# Patient Record
Sex: FEMALE | Race: WHITE | NOT HISPANIC OR LATINO | URBAN - METROPOLITAN AREA
[De-identification: names, ages, dates, MRNs, and addresses within clinical notes are randomized per-mention and may not be internally consistent; named-entity substitution may affect disease eponyms.]

---

## 2018-05-12 ENCOUNTER — INPATIENT (INPATIENT)
Facility: HOSPITAL | Age: 83
LOS: 4 days | Discharge: HOME | DRG: 871 | End: 2018-05-17
Attending: INTERNAL MEDICINE | Admitting: INTERNAL MEDICINE
Payer: MEDICARE

## 2018-05-12 VITALS
RESPIRATION RATE: 16 BRPM | HEIGHT: 62 IN | HEART RATE: 92 BPM | WEIGHT: 130.07 LBS | TEMPERATURE: 103 F | SYSTOLIC BLOOD PRESSURE: 151 MMHG | OXYGEN SATURATION: 94 % | DIASTOLIC BLOOD PRESSURE: 77 MMHG

## 2018-05-12 DIAGNOSIS — J18.9 PNEUMONIA, UNSPECIFIED ORGANISM: ICD-10-CM

## 2018-05-12 PROBLEM — Z00.00 ENCOUNTER FOR PREVENTIVE HEALTH EXAMINATION: Status: ACTIVE | Noted: 2018-05-12

## 2018-05-12 LAB
ACETONE SERPL-MCNC: ABNORMAL
ALBUMIN SERPL ELPH-MCNC: 3.3 G/DL — LOW (ref 3.5–5)
ALP SERPL-CCNC: 91 U/L — SIGNIFICANT CHANGE UP (ref 40–120)
ALT FLD-CCNC: 10 U/L DA — SIGNIFICANT CHANGE UP (ref 10–60)
ANION GAP SERPL CALC-SCNC: 6 MMOL/L — SIGNIFICANT CHANGE UP (ref 5–17)
APPEARANCE UR: CLEAR — SIGNIFICANT CHANGE UP
APTT BLD: 27.4 SEC — LOW (ref 27.5–37.4)
AST SERPL-CCNC: 22 U/L — SIGNIFICANT CHANGE UP (ref 10–40)
BACTERIA # UR AUTO: ABNORMAL /HPF
BASOPHILS # BLD AUTO: 0.1 K/UL — SIGNIFICANT CHANGE UP (ref 0–0.2)
BASOPHILS NFR BLD AUTO: 0.4 % — SIGNIFICANT CHANGE UP (ref 0–2)
BILIRUB SERPL-MCNC: 0.6 MG/DL — SIGNIFICANT CHANGE UP (ref 0.2–1.2)
BILIRUB UR-MCNC: NEGATIVE — SIGNIFICANT CHANGE UP
BUN SERPL-MCNC: 20 MG/DL — HIGH (ref 7–18)
CALCIUM SERPL-MCNC: 8.8 MG/DL — SIGNIFICANT CHANGE UP (ref 8.4–10.5)
CHLORIDE SERPL-SCNC: 105 MMOL/L — SIGNIFICANT CHANGE UP (ref 96–108)
CO2 SERPL-SCNC: 25 MMOL/L — SIGNIFICANT CHANGE UP (ref 22–31)
COLOR SPEC: YELLOW — SIGNIFICANT CHANGE UP
CREAT SERPL-MCNC: 1.1 MG/DL — SIGNIFICANT CHANGE UP (ref 0.5–1.3)
DIFF PNL FLD: ABNORMAL
EOSINOPHIL # BLD AUTO: 0 K/UL — SIGNIFICANT CHANGE UP (ref 0–0.5)
EOSINOPHIL NFR BLD AUTO: 0.1 % — SIGNIFICANT CHANGE UP (ref 0–6)
EPI CELLS # UR: ABNORMAL /HPF
GLUCOSE SERPL-MCNC: 102 MG/DL — HIGH (ref 70–99)
GLUCOSE UR QL: NEGATIVE — SIGNIFICANT CHANGE UP
HCT VFR BLD CALC: 36.8 % — SIGNIFICANT CHANGE UP (ref 34.5–45)
HGB BLD-MCNC: 11.8 G/DL — SIGNIFICANT CHANGE UP (ref 11.5–15.5)
INR BLD: 1.01 RATIO — SIGNIFICANT CHANGE UP (ref 0.88–1.16)
KETONES UR-MCNC: NEGATIVE — SIGNIFICANT CHANGE UP
LACTATE SERPL-SCNC: 1.5 MMOL/L — SIGNIFICANT CHANGE UP (ref 0.7–2)
LEUKOCYTE ESTERASE UR-ACNC: ABNORMAL
LYMPHOCYTES # BLD AUTO: 0.5 K/UL — LOW (ref 1–3.3)
LYMPHOCYTES # BLD AUTO: 3.6 % — LOW (ref 13–44)
MAGNESIUM SERPL-MCNC: 1.7 MG/DL — SIGNIFICANT CHANGE UP (ref 1.6–2.6)
MCHC RBC-ENTMCNC: 28.7 PG — SIGNIFICANT CHANGE UP (ref 27–34)
MCHC RBC-ENTMCNC: 32.2 GM/DL — SIGNIFICANT CHANGE UP (ref 32–36)
MCV RBC AUTO: 89.1 FL — SIGNIFICANT CHANGE UP (ref 80–100)
MONOCYTES # BLD AUTO: 0.1 K/UL — SIGNIFICANT CHANGE UP (ref 0–0.9)
MONOCYTES NFR BLD AUTO: 0.9 % — LOW (ref 2–14)
NEUTROPHILS # BLD AUTO: 12.4 K/UL — HIGH (ref 1.8–7.4)
NEUTROPHILS NFR BLD AUTO: 94.9 % — HIGH (ref 43–77)
NITRITE UR-MCNC: POSITIVE
NT-PROBNP SERPL-SCNC: 789 PG/ML — HIGH (ref 0–450)
PH UR: 6.5 — SIGNIFICANT CHANGE UP (ref 5–8)
PLATELET # BLD AUTO: 152 K/UL — SIGNIFICANT CHANGE UP (ref 150–400)
POTASSIUM SERPL-MCNC: 4.1 MMOL/L — SIGNIFICANT CHANGE UP (ref 3.5–5.3)
POTASSIUM SERPL-SCNC: 4.1 MMOL/L — SIGNIFICANT CHANGE UP (ref 3.5–5.3)
PROT SERPL-MCNC: 6.8 G/DL — SIGNIFICANT CHANGE UP (ref 6–8.3)
PROT UR-MCNC: NEGATIVE — SIGNIFICANT CHANGE UP
PROTHROM AB SERPL-ACNC: 11 SEC — SIGNIFICANT CHANGE UP (ref 9.8–12.7)
RBC # BLD: 4.13 M/UL — SIGNIFICANT CHANGE UP (ref 3.8–5.2)
RBC # FLD: 13.6 % — SIGNIFICANT CHANGE UP (ref 10.3–14.5)
RBC CASTS # UR COMP ASSIST: ABNORMAL /HPF (ref 0–2)
SODIUM SERPL-SCNC: 136 MMOL/L — SIGNIFICANT CHANGE UP (ref 135–145)
SP GR SPEC: 1.01 — SIGNIFICANT CHANGE UP (ref 1.01–1.02)
UROBILINOGEN FLD QL: NEGATIVE — SIGNIFICANT CHANGE UP
WBC # BLD: 13.1 K/UL — HIGH (ref 3.8–10.5)
WBC # FLD AUTO: 13.1 K/UL — HIGH (ref 3.8–10.5)
WBC UR QL: SIGNIFICANT CHANGE UP /HPF (ref 0–5)

## 2018-05-12 PROCEDURE — 71045 X-RAY EXAM CHEST 1 VIEW: CPT | Mod: 26

## 2018-05-12 PROCEDURE — 93010 ELECTROCARDIOGRAM REPORT: CPT

## 2018-05-12 PROCEDURE — 70450 CT HEAD/BRAIN W/O DYE: CPT | Mod: 26

## 2018-05-12 PROCEDURE — 99285 EMERGENCY DEPT VISIT HI MDM: CPT

## 2018-05-12 RX ORDER — ACETAMINOPHEN 500 MG
650 TABLET ORAL ONCE
Qty: 0 | Refills: 0 | Status: COMPLETED | OUTPATIENT
Start: 2018-05-12 | End: 2018-05-12

## 2018-05-12 RX ORDER — SODIUM CHLORIDE 9 MG/ML
1000 INJECTION INTRAMUSCULAR; INTRAVENOUS; SUBCUTANEOUS
Qty: 0 | Refills: 0 | Status: DISCONTINUED | OUTPATIENT
Start: 2018-05-12 | End: 2018-05-13

## 2018-05-12 RX ORDER — PIPERACILLIN AND TAZOBACTAM 4; .5 G/20ML; G/20ML
3.38 INJECTION, POWDER, LYOPHILIZED, FOR SOLUTION INTRAVENOUS ONCE
Qty: 0 | Refills: 0 | Status: COMPLETED | OUTPATIENT
Start: 2018-05-12 | End: 2018-05-12

## 2018-05-12 RX ORDER — SODIUM CHLORIDE 9 MG/ML
1000 INJECTION INTRAMUSCULAR; INTRAVENOUS; SUBCUTANEOUS ONCE
Qty: 0 | Refills: 0 | Status: COMPLETED | OUTPATIENT
Start: 2018-05-12 | End: 2018-05-12

## 2018-05-12 RX ADMIN — Medication 650 MILLIGRAM(S): at 20:19

## 2018-05-12 RX ADMIN — PIPERACILLIN AND TAZOBACTAM 200 GRAM(S): 4; .5 INJECTION, POWDER, LYOPHILIZED, FOR SOLUTION INTRAVENOUS at 20:35

## 2018-05-12 RX ADMIN — SODIUM CHLORIDE 1000 MILLILITER(S): 9 INJECTION INTRAMUSCULAR; INTRAVENOUS; SUBCUTANEOUS at 21:38

## 2018-05-12 RX ADMIN — SODIUM CHLORIDE 125 MILLILITER(S): 9 INJECTION INTRAMUSCULAR; INTRAVENOUS; SUBCUTANEOUS at 20:27

## 2018-05-12 NOTE — ED PROVIDER NOTE - CARE PLAN
Principal Discharge DX:	PNA (pneumonia)  Secondary Diagnosis:	UTI (urinary tract infection)  Secondary Diagnosis:	Dehydration

## 2018-05-12 NOTE — ED ADULT NURSE NOTE - OBJECTIVE STATEMENT
Pt came in with daughter by the bedside. Pt was brought for fever as per daughter and was shaking. Pt was BIBA. Pt medicated per order. Pt not in distress.

## 2018-05-12 NOTE — ED PROVIDER NOTE - OBJECTIVE STATEMENT
89 y.o. female BIBA as per daughter, noted pt with shaky ~18:00 by HHA, fever, no appetite this evening, no coughing, vomiting,

## 2018-05-13 DIAGNOSIS — A41.9 SEPSIS, UNSPECIFIED ORGANISM: ICD-10-CM

## 2018-05-13 DIAGNOSIS — E78.00 PURE HYPERCHOLESTEROLEMIA, UNSPECIFIED: ICD-10-CM

## 2018-05-13 DIAGNOSIS — Z29.9 ENCOUNTER FOR PROPHYLACTIC MEASURES, UNSPECIFIED: ICD-10-CM

## 2018-05-13 DIAGNOSIS — I10 ESSENTIAL (PRIMARY) HYPERTENSION: ICD-10-CM

## 2018-05-13 DIAGNOSIS — J18.9 PNEUMONIA, UNSPECIFIED ORGANISM: ICD-10-CM

## 2018-05-13 LAB — RAPID RVP RESULT: SIGNIFICANT CHANGE UP

## 2018-05-13 PROCEDURE — 74177 CT ABD & PELVIS W/CONTRAST: CPT | Mod: 26

## 2018-05-13 RX ORDER — CEFTRIAXONE 500 MG/1
INJECTION, POWDER, FOR SOLUTION INTRAMUSCULAR; INTRAVENOUS
Qty: 0 | Refills: 0 | Status: DISCONTINUED | OUTPATIENT
Start: 2018-05-13 | End: 2018-05-13

## 2018-05-13 RX ORDER — MULTIVIT-MIN/FERROUS GLUCONATE 9 MG/15 ML
1 LIQUID (ML) ORAL DAILY
Qty: 0 | Refills: 0 | Status: DISCONTINUED | OUTPATIENT
Start: 2018-05-13 | End: 2018-05-17

## 2018-05-13 RX ORDER — SIMVASTATIN 20 MG/1
10 TABLET, FILM COATED ORAL AT BEDTIME
Qty: 0 | Refills: 0 | Status: DISCONTINUED | OUTPATIENT
Start: 2018-05-13 | End: 2018-05-17

## 2018-05-13 RX ORDER — HEPARIN SODIUM 5000 [USP'U]/ML
5000 INJECTION INTRAVENOUS; SUBCUTANEOUS EVERY 12 HOURS
Qty: 0 | Refills: 0 | Status: DISCONTINUED | OUTPATIENT
Start: 2018-05-13 | End: 2018-05-17

## 2018-05-13 RX ORDER — ACETAMINOPHEN 500 MG
650 TABLET ORAL EVERY 6 HOURS
Qty: 0 | Refills: 0 | Status: DISCONTINUED | OUTPATIENT
Start: 2018-05-13 | End: 2018-05-17

## 2018-05-13 RX ORDER — IPRATROPIUM/ALBUTEROL SULFATE 18-103MCG
3 AEROSOL WITH ADAPTER (GRAM) INHALATION EVERY 6 HOURS
Qty: 0 | Refills: 0 | Status: DISCONTINUED | OUTPATIENT
Start: 2018-05-13 | End: 2018-05-17

## 2018-05-13 RX ORDER — DIPHENHYDRAMINE HCL 50 MG
25 CAPSULE ORAL EVERY 4 HOURS
Qty: 0 | Refills: 0 | Status: COMPLETED | OUTPATIENT
Start: 2018-05-13 | End: 2018-05-13

## 2018-05-13 RX ORDER — SODIUM CHLORIDE 9 MG/ML
1000 INJECTION INTRAMUSCULAR; INTRAVENOUS; SUBCUTANEOUS ONCE
Qty: 0 | Refills: 0 | Status: COMPLETED | OUTPATIENT
Start: 2018-05-13 | End: 2018-05-13

## 2018-05-13 RX ORDER — AZITHROMYCIN 500 MG/1
500 TABLET, FILM COATED ORAL EVERY 24 HOURS
Qty: 0 | Refills: 0 | Status: DISCONTINUED | OUTPATIENT
Start: 2018-05-14 | End: 2018-05-14

## 2018-05-13 RX ORDER — AZITHROMYCIN 500 MG/1
TABLET, FILM COATED ORAL
Qty: 0 | Refills: 0 | Status: DISCONTINUED | OUTPATIENT
Start: 2018-05-13 | End: 2018-05-14

## 2018-05-13 RX ORDER — CEFTRIAXONE 500 MG/1
1 INJECTION, POWDER, FOR SOLUTION INTRAMUSCULAR; INTRAVENOUS ONCE
Qty: 0 | Refills: 0 | Status: COMPLETED | OUTPATIENT
Start: 2018-05-13 | End: 2018-05-13

## 2018-05-13 RX ORDER — PANTOPRAZOLE SODIUM 20 MG/1
40 TABLET, DELAYED RELEASE ORAL
Qty: 0 | Refills: 0 | Status: DISCONTINUED | OUTPATIENT
Start: 2018-05-13 | End: 2018-05-17

## 2018-05-13 RX ORDER — SODIUM CHLORIDE 9 MG/ML
1000 INJECTION INTRAMUSCULAR; INTRAVENOUS; SUBCUTANEOUS
Qty: 0 | Refills: 0 | Status: DISCONTINUED | OUTPATIENT
Start: 2018-05-13 | End: 2018-05-13

## 2018-05-13 RX ORDER — PIPERACILLIN AND TAZOBACTAM 4; .5 G/20ML; G/20ML
3.38 INJECTION, POWDER, LYOPHILIZED, FOR SOLUTION INTRAVENOUS EVERY 8 HOURS
Qty: 0 | Refills: 0 | Status: DISCONTINUED | OUTPATIENT
Start: 2018-05-13 | End: 2018-05-14

## 2018-05-13 RX ORDER — SODIUM CHLORIDE 9 MG/ML
1000 INJECTION INTRAMUSCULAR; INTRAVENOUS; SUBCUTANEOUS
Qty: 0 | Refills: 0 | Status: DISCONTINUED | OUTPATIENT
Start: 2018-05-13 | End: 2018-05-16

## 2018-05-13 RX ORDER — AZITHROMYCIN 500 MG/1
500 TABLET, FILM COATED ORAL ONCE
Qty: 0 | Refills: 0 | Status: COMPLETED | OUTPATIENT
Start: 2018-05-13 | End: 2018-05-13

## 2018-05-13 RX ORDER — KETOROLAC TROMETHAMINE 30 MG/ML
15 SYRINGE (ML) INJECTION EVERY 8 HOURS
Qty: 0 | Refills: 0 | Status: DISCONTINUED | OUTPATIENT
Start: 2018-05-13 | End: 2018-05-14

## 2018-05-13 RX ORDER — SODIUM CHLORIDE 9 MG/ML
500 INJECTION INTRAMUSCULAR; INTRAVENOUS; SUBCUTANEOUS ONCE
Qty: 0 | Refills: 0 | Status: DISCONTINUED | OUTPATIENT
Start: 2018-05-13 | End: 2018-05-13

## 2018-05-13 RX ADMIN — PIPERACILLIN AND TAZOBACTAM 25 GRAM(S): 4; .5 INJECTION, POWDER, LYOPHILIZED, FOR SOLUTION INTRAVENOUS at 15:02

## 2018-05-13 RX ADMIN — Medication 25 MILLIGRAM(S): at 18:34

## 2018-05-13 RX ADMIN — Medication 1 TABLET(S): at 12:22

## 2018-05-13 RX ADMIN — CEFTRIAXONE 100 GRAM(S): 500 INJECTION, POWDER, FOR SOLUTION INTRAMUSCULAR; INTRAVENOUS at 02:15

## 2018-05-13 RX ADMIN — Medication 3 MILLILITER(S): at 15:02

## 2018-05-13 RX ADMIN — PIPERACILLIN AND TAZOBACTAM 25 GRAM(S): 4; .5 INJECTION, POWDER, LYOPHILIZED, FOR SOLUTION INTRAVENOUS at 22:34

## 2018-05-13 RX ADMIN — AZITHROMYCIN 250 MILLIGRAM(S): 500 TABLET, FILM COATED ORAL at 02:15

## 2018-05-13 RX ADMIN — HEPARIN SODIUM 5000 UNIT(S): 5000 INJECTION INTRAVENOUS; SUBCUTANEOUS at 17:42

## 2018-05-13 RX ADMIN — SIMVASTATIN 10 MILLIGRAM(S): 20 TABLET, FILM COATED ORAL at 22:34

## 2018-05-13 RX ADMIN — SODIUM CHLORIDE 3000 MILLILITER(S): 9 INJECTION INTRAMUSCULAR; INTRAVENOUS; SUBCUTANEOUS at 09:20

## 2018-05-13 RX ADMIN — Medication 3 MILLILITER(S): at 21:00

## 2018-05-13 RX ADMIN — HEPARIN SODIUM 5000 UNIT(S): 5000 INJECTION INTRAVENOUS; SUBCUTANEOUS at 05:46

## 2018-05-13 RX ADMIN — PANTOPRAZOLE SODIUM 40 MILLIGRAM(S): 20 TABLET, DELAYED RELEASE ORAL at 12:22

## 2018-05-13 NOTE — H&P ADULT - NSHPLABSRESULTS_GEN_ALL_CORE
05-12    136  |  105  |  20<H>  ----------------------------<  102<H>  4.1   |  25  |  1.10    Ca    8.8      12 May 2018 20:23  Mg     1.7     05-12    TPro  6.8  /  Alb  3.3<L>  /  TBili  0.6  /  DBili  x   /  AST  22  /  ALT  10  /  AlkPhos  91  05-12    CBC Full  -  ( 12 May 2018 20:23 )  WBC Count : 13.1 K/uL  Hemoglobin : 11.8 g/dL  Hematocrit : 36.8 %  Platelet Count - Automated : 152 K/uL  Mean Cell Volume : 89.1 fl  Mean Cell Hemoglobin : 28.7 pg  Mean Cell Hemoglobin Concentration : 32.2 gm/dL  Auto Neutrophil # : 12.4 K/uL  Auto Lymphocyte # : 0.5 K/uL  Auto Monocyte # : 0.1 K/uL  Auto Eosinophil # : 0.0 K/uL  Auto Basophil # : 0.1 K/uL  Auto Neutrophil % : 94.9 %  Auto Lymphocyte % : 3.6 %  Auto Monocyte % : 0.9 %  Auto Eosinophil % : 0.1 %  Auto Basophil % : 0.4 %

## 2018-05-13 NOTE — H&P ADULT - ATTENDING COMMENTS
I agree with the above information  89 yo F p/w sob, malaise, fatigue. CXR (+) infiltrate. Vitals: febrile and labs (+) leukocytosis.   Admitted for:   -Sepsis 2/2 CAPNA - c/w abx, nebs, supportive care, oxygen supplementation, pt. Pulm, cardio, and ID recs appreciated. F/u cultures to final date.    -Essential HTN - holding home antihypertensive rx 2/2 low normal bp, adjust rx prn, monitor vitals   -DVT/GI PPx  -Hypercholesterolemia: c/w home rx

## 2018-05-13 NOTE — PROGRESS NOTE ADULT - ATTENDING COMMENTS
89 yo F p/w sob, malaise, fatigue. CXR (+) infiltrate. Vitals: febrile and labs (+) leukocytosis.   Admitted for:   -Sepsis 2/2 CAPNA - c/w abx, nebs, supportive care, oxygen supplementation, pt. Pulm, cardio, and ID recs appreciated. F/u cultures to final date.    -Essential HTN - holding home antihypertensive rx 2/2 low normal bp, adjust rx prn, monitor vitals   -DVT/GI PPx  -Hypercholesterolemia: c/w home rx 89 yo F p/w sob, malaise, fatigue. CXR (+) infiltrate. Vitals: febrile and labs (+) leukocytosis.   Admitted for:   -Sepsis 2/2 CAPNA - c/w abx, nebs, supportive care, oxygen supplementation, pt. Pulm, cardio, and ID recs appreciated. F/u cultures to final date.    -MIN vs CKD vs Acute on Chronic - stage 3, avoid nephrotoxic rx, nephro on board.  -Essential HTN - holding home antihypertensive rx 2/2 low normal bp, adjust rx prn, monitor vitals   -DVT/GI PPx  -Hypercholesterolemia: c/w home rx

## 2018-05-13 NOTE — H&P ADULT - PROBLEM SELECTOR PLAN 5
VTEIMPROVE VTE Individual Risk Assessment          RISK                                                          Points  [  ] Previous VTE                                                3  [  ] Thrombophilia                                             2  [  ] Lower limb paralysis                                   2        (unable to hold up >15 seconds)    [  ] Current Cancer                                             2         (within 6 months)  [z  ] Immobilization > 24 hrs                              1  [  ] ICU/CCU stay > 24 hours                             1  [ x ] Age > 60                                                         1    IMPROVE VTE Score:     VTE score 2 will give heparin for DVT prophylaxis

## 2018-05-13 NOTE — CONSULT NOTE ADULT - SUBJECTIVE AND OBJECTIVE BOX
She is a 89 y/o female PMH dementia, HTN, hyperlipidemia and a prior TIA. She is now admitted with fever r/o sepsis. Cardiology is called because her EKG shows a first degree AV delay. The QRS is narrow. There is no history of syncope, nor prior cardiac workup on file.      Patient is a 89y old  Female who presents with a chief complaint of chills and fever (13 May 2018 00:10)      REVIEW OF SYSTEMS:          PAST MEDICAL & SURGICAL HISTORY:  TIA (transient ischemic attack)  Hypercholesterolemia  HTN (hypertension)  No significant past surgical history      SOCIAL HISTORY  Alcohol:  Tobacco:  Illicit substance use:      FAMILY HISTORY: Non contributory to the present illness        ALLERGIES: NKDA        T(C): 37.2 (18 @ 15:53), Max: 39.2 (18 @ 19:41)  HR: 88 (18 @ 15:53) (63 - 98)  BP: 102/54 (18 @ 15:53) (89/56 - 151/77)  RR: 18 (18 @ 15:53) (16 - 18)  SpO2: 99% (18 @ 15:53) (93% - 99%)  Wt(kg): --  I&O's Summary        PHYSICAL EXAM:  GENERAL: N  CVS:  RESP:  GI:  EXT:  CNS:            LABS:                        11.8   13.1  )-----------( 152      ( 12 May 2018 20:23 )             36.8               136  |  105  |  20<H>  ----------------------------<  102<H>  4.1   |  25  |  1.10    Ca    8.8      12 May 2018 20:23  Mg     1.7         TPro  6.8  /  Alb  3.3<L>  /  TBili  0.6  /  DBili  x   /  AST  22  /  ALT  10  /  AlkPhos  91      PT/INR - ( 12 May 2018 20:23 )   PT: 11.0 sec;   INR: 1.01 ratio         PTT - ( 12 May 2018 20:23 )  PTT:27.4 sec  Urinalysis Basic - ( 12 May 2018 21:09 )    Color: Yellow / Appearance: Clear / S.010 / pH: x  Gluc: x / Ketone: Negative  / Bili: Negative / Urobili: Negative   Blood: x / Protein: Negative / Nitrite: Positive   Leuk Esterase: Small / RBC: 2-5 /HPF / WBC 3-5 /HPF   Sq Epi: x / Non Sq Epi: Occasional /HPF / Bacteria: TNTC /HPF            MEDICATIONS  (STANDING):  ALBUTerol/ipratropium for Nebulization 3 milliLiter(s) Nebulizer every 6 hours  azithromycin  IVPB      heparin  Injectable 5000 Unit(s) SubCutaneous every 12 hours  multivitamin/minerals 1 Tablet(s) Oral daily  pantoprazole    Tablet 40 milliGRAM(s) Oral before breakfast  piperacillin/tazobactam IVPB. 3.375 Gram(s) IV Intermittent every 8 hours  simvastatin 10 milliGRAM(s) Oral at bedtime  sodium chloride 0.9%. 1000 milliLiter(s) (50 mL/Hr) IV Continuous <Continuous>    MEDICATIONS  (PRN):  acetaminophen   Tablet 650 milliGRAM(s) Oral every 6 hours PRN For Temp greater than 38 C (100.4 F)  acetaminophen   Tablet. 650 milliGRAM(s) Oral every 6 hours PRN Mild Pain (1 - 3)  diphenhydrAMINE   Capsule 25 milliGRAM(s) Oral every 4 hours PRN Rash and/or Itching  ketorolac   Injectable 15 milliGRAM(s) IV Push every 8 hours PRN Moderate Pain (4 - 6)          RADIOLOGY & ADDITIONAL TESTS:    < from: CT Abdomen and Pelvis w/ Oral Cont and w/ IV Cont (18 @ 04:25) >  Possible gastritis. Clinical correlation is suggested..    < end of copied text >      < from: Xray Chest 1 View AP/PA (18 @ 20:07) >  Study limiteddue to rotation. Overlying chin obscures right lung apex.    No gross consolidation or pleural effusion.    Chronic rib deformities. Chronic right clavicle deformity.      < end of copied text >        MICROBIOLOGY DATA:    Rapid Respiratory Viral Panel (18 @ 23:42)    Rapid RVP Result: NotDetec: The FilmArray RVP Rapid uses polymerase chain reaction (PCR) and melt  curve analysis to screen for adenovirus; coronavirus HKU1, NL63, 229E,  OC43; human metapneumovirus (hMPV); human enterovirus/rhinovirus  (Entero/RV); influenza A; influenza A/H1;influenza A/H3; influenza  A/H1-2009; influenza B; parainfluenza viruses 1, 2, 3, 4; respiratory  syncytial virus; Bordetella pertussis; Mycoplasma pneumoniae; and  Chlamydophila pneumoniae. She is a 89 yo female with dementia, HTN, hyperlipidemia and a prior TIA, brought in to the ER from home for evaluation of chills and fever.  On admission, she found to have fever, Leukocytosis, and mildly positive urine analysis. The RVP and CXR is Negative.  She has started on Zosyn and The ID consult requested to assist with further evaluation and antibiotic management.           REVIEW OF SYSTEMS: Total of twelve systems have been reviewed with patient and found to be negative unless mentioned in HPI        PAST MEDICAL & SURGICAL HISTORY:  TIA (transient ischemic attack)  Hypercholesterolemia  HTN (hypertension)  No significant past surgical history        SOCIAL HISTORY  Alcohol: Does not drink  Tobacco: Does not smoke  Illicit substance use: None        FAMILY HISTORY: Non contributory to the present illness        ALLERGIES: NKDA        T(C): 37.2 (18 @ 15:53), Max: 39.2 (18 @ 19:41)  HR: 88 (18 @ 15:53) (63 - 98)  BP: 102/54 (18 @ 15:53) (89/56 - 151/77)  RR: 18 (18 @ 15:53) (16 - 18)  SpO2: 99% (18 @ 15:53) (93% - 99%)  Wt(kg): --  I&O's Summary          PHYSICAL EXAM:  GENERAL: Not in distress  CVS: s1  and s2 present  RESP: Air entry B/L  GI: Abdomen nondistended and nontender  EXT: No pedal edema  CNS: Awake, Alert and demented             LABS:                        11.8   13.1  )-----------( 152      ( 12 May 2018 20:23 )             36.8               136  |  105  |  20<H>  ----------------------------<  102<H>  4.1   |  25  |  1.10    Ca    8.8      12 May 2018 20:23  Mg     1.7         TPro  6.8  /  Alb  3.3<L>  /  TBili  0.6  /  DBili  x   /  AST  22  /  ALT  10  /  AlkPhos  91      PT/INR - ( 12 May 2018 20:23 )   PT: 11.0 sec;   INR: 1.01 ratio         PTT - ( 12 May 2018 20:23 )  PTT:27.4 sec  Urinalysis Basic - ( 12 May 2018 21:09 )    Color: Yellow / Appearance: Clear / S.010 / pH: x  Gluc: x / Ketone: Negative  / Bili: Negative / Urobili: Negative   Blood: x / Protein: Negative / Nitrite: Positive   Leuk Esterase: Small / RBC: 2-5 /HPF / WBC 3-5 /HPF   Sq Epi: x / Non Sq Epi: Occasional /HPF / Bacteria: TNTC /HPF            MEDICATIONS  (STANDING):  ALBUTerol/ipratropium for Nebulization 3 milliLiter(s) Nebulizer every 6 hours  azithromycin  IVPB      heparin  Injectable 5000 Unit(s) SubCutaneous every 12 hours  multivitamin/minerals 1 Tablet(s) Oral daily  pantoprazole    Tablet 40 milliGRAM(s) Oral before breakfast  piperacillin/tazobactam IVPB. 3.375 Gram(s) IV Intermittent every 8 hours  simvastatin 10 milliGRAM(s) Oral at bedtime  sodium chloride 0.9%. 1000 milliLiter(s) (50 mL/Hr) IV Continuous <Continuous>    MEDICATIONS  (PRN):  acetaminophen   Tablet 650 milliGRAM(s) Oral every 6 hours PRN For Temp greater than 38 C (100.4 F)  acetaminophen   Tablet. 650 milliGRAM(s) Oral every 6 hours PRN Mild Pain (1 - 3)  diphenhydrAMINE   Capsule 25 milliGRAM(s) Oral every 4 hours PRN Rash and/or Itching  ketorolac   Injectable 15 milliGRAM(s) IV Push every 8 hours PRN Moderate Pain (4 - 6)          RADIOLOGY & ADDITIONAL TESTS:    18: CT Abdomen and Pelvis w/ Oral Cont and w/ IV Cont (18 @ 04:25) Possible gastritis. Clinical correlation is suggested.      18: Xray Chest 1 View AP/PA (18 @ 20:07) Study limited due to rotation. Overlying chin obscures right lung apex. No gross consolidation or pleural effusion. Chronic rib deformities. Chronic right clavicle deformity.          MICROBIOLOGY DATA:    Rapid Respiratory Viral Panel (18 @ 23:42)    Rapid RVP Result: NotDetec: The FilmArray RVP Rapid uses polymerase chain reaction (PCR) and melt  curve analysis to screen for adenovirus; coronavirus HKU1, NL63, 229E,  OC43; human metapneumovirus (hMPV); human enterovirus/rhinovirus  (Entero/RV); influenza A; influenza A/H1;influenza A/H3; influenza  A/H1-2009; influenza B; parainfluenza viruses 1, 2, 3, 4; respiratory  syncytial virus; Bordetella pertussis; Mycoplasma pneumoniae; and  Chlamydophila pneumoniae.

## 2018-05-13 NOTE — CONSULT NOTE ADULT - SUBJECTIVE AND OBJECTIVE BOX
CARDIOLOGY ATTENDING      HISTORY OF PRESENT ILLNESS: She is a 89 y/o female PMH dementia, HTN, hyperlipidemia and a prior TIA. She is now admitted with fever r/o sepsis. Cardiology is called because her EKG shows a first degree AV delay. The QRS is narrow. There is no history of syncope, nor prior cardiac workup on file.    PAST MEDICAL & SURGICAL HISTORY:  TIA (transient ischemic attack)  Hypercholesterolemia  HTN (hypertension)  dementia    No significant past surgical history    MEDICATIONS  (STANDING):  ALBUTerol/ipratropium for Nebulization 3 milliLiter(s) Nebulizer every 6 hours  azithromycin  IVPB      heparin  Injectable 5000 Unit(s) SubCutaneous every 12 hours  multivitamin/minerals 1 Tablet(s) Oral daily  pantoprazole    Tablet 40 milliGRAM(s) Oral before breakfast  piperacillin/tazobactam IVPB. 3.375 Gram(s) IV Intermittent every 8 hours  simvastatin 10 milliGRAM(s) Oral at bedtime  sodium chloride 0.9%. 1000 milliLiter(s) (50 mL/Hr) IV Continuous <Continuous>    No Known Allergies    FAMILY HISTORY:  Non-contributary for premature coronary disease or sudden cardiac death    SOCIAL HISTORY:    [x ] Non-smoker  [ ] Smoker  [ ] Alcohol      REVIEW OF SYSTEMS:  [ ]chest pain  [  ]shortness of breath  [  ]palpitations  [  ]syncope  [ ]near syncope [ ]upper extremity weakness   [ ] lower extremity weakness  [  ]diplopia  [  ]altered mental status   [x  ]fevers  [x ]chills [ ]nausea  [ ]vomitting  [  ]dysphagia    [ ]abdominal pain  [ ]melena  [ ]BRBPR    [  ]epistaxis  [  ]rash    [ ]lower extremity edema        [ ] All others negative	  [ ] Unable to obtain    PHYSICAL EXAM:  T(C): 37.1 (05-13-18 @ 12:21), Max: 39.2 (05-12-18 @ 19:41)  HR: 86 (05-13-18 @ 12:21) (63 - 98)  BP: 97/45 (05-13-18 @ 12:21) (89/56 - 151/77)  RR: 16 (05-13-18 @ 12:21) (16 - 17)  SpO2: 98% (05-13-18 @ 12:21) (93% - 98%)  Wt(kg): --    no JVD  RRR, no murmurs  CTAB  soft nt/nd  no c/c/e    TELEMETRY: 	  no events  ECG:  	NSR, 1st degree AV delay, otherwise unremarkable     Echo: n/a  NST: n/a  Cath: n/a  	  	  LABS:	 	                          11.8   13.1  )-----------( 152      ( 12 May 2018 20:23 )             36.8     05-12    136  |  105  |  20<H>  ----------------------------<  102<H>  4.1   |  25  |  1.10    Ca    8.8      12 May 2018 20:23  Mg     1.7     05-12    TPro  6.8  /  Alb  3.3<L>  /  TBili  0.6  /  DBili  x   /  AST  22  /  ALT  10  /  AlkPhos  91  05-12    proBNP: Serum Pro-Brain Natriuretic Peptide: 789 pg/mL (05-12 @ 20:23)    Lipid Profile:   HgA1c:   TSH:     A/P) She is a 89 y/o female PMH dementia, HTN, hyperlipidemia and a prior TIA. She is now admitted with fever r/o sepsis. Cardiology is called because her EKG shows a first degree AV delay. The QRS is narrow. There is no history of syncope, nor prior cardiac workup on file.    -f/u BCx and UCx  -antibiotics as per primary team  -no indication for cardiac imaging  -no further cardiac workup needed at this time  -if BCx grow gram + cocci then would get TTE r/o endocarditis      Jose Daniel Carter M.D., [facility]  Cardiac Electrophysiology  [facility]  [address] [place] [address], [address] [place] [address]  [email or web address]    office [phone]  pager [phone]

## 2018-05-13 NOTE — H&P ADULT - ASSESSMENT
89 year old female from lives with her daughter, needs 24 hr Assistance with  ADL's, HHA 12 hrs. Past medical H/o of dementia, HTN, TIA, HLD brought to ED  with C/o chills and fever since 1 day. Patient is minimall verbal, [place], History was given by her daughter. Patient was in her usual health, but this evening she was having chills, and tempt of 102 F at home along with fatigue and weaknes. patient has never been sick or any recent hospitalizations. Patient has one episode of Vomiting and complains of left lower quadrant pain, she has bowel movement yesterday, no diarrhea or constipation.   Denies any headaches, neck pain or rigidity, SOB, cough, chest pain, dysuria or leg pains. Patient denies any sick contacts, flu like symptoms or recent travel.     In ED patient was febrile Tmax 102.6, vitals HR 92, /77, RR 16 and O2 sat 94 at room air, Leucocytosis, 89 year old female from lives with her daughter, needs 24 hr Assistance with  ADL's, HHA 12 hrs. Past medical H/o of dementia, HTN, TIA, HLD brought to ED  with C/o chills and fever since 1 day. Patient is minimall verbal, [place], History was given by her daughter. Patient was in her usual health, but this evening she was having chills, and tempt of 102 F at home along with fatigue and weaknes. patient has never been sick or any recent hospitalizations. Patient has one episode of Vomiting and complains of left lower quadrant pain, she has bowel movement yesterday, no diarrhea or constipation.   Denies any headaches, neck pain or rigidity, SOB, cough, chest pain, dysuria or leg pains. Patient denies any sick contacts, flu like symptoms or recent travel.     In ED patient was febrile Tmax 102.6, vitals HR 92, /77, RR 16 and O2 sat 94 at room air, Leucocytosis, EKG NSR no ST t wave changes. 89 year old female from lives with her daughter, needs 24 hr Assistance with  ADL's, HHA 12 hrs. Past medical H/o of dementia, HTN, TIA, HLD brought to ED  with C/o chills and fever since 1 day. Patient is minimall verbal, [place], History was given by her daughter. Patient was in her usual health, but this evening she was having chills, and tempt of 102 F at home along with fatigue and weaknes, sob, c.p, one episode of Vomiting and complains of left lower quadrant pain, she has bowel movement yesterday, no diarrhea or constipation.   Denies any headaches, neck pain or rigidity, SOB, cough, chest pain, dysuria or leg pains. Patient denies any sick contacts, flu like symptoms or recent travel.     In ED patient was febrile Tmax 102.6, vitals HR 92, /77, RR 16 and O2 sat 94 at room air, Leucocytosis, EKG NSR no ST t wave changes.

## 2018-05-13 NOTE — H&P ADULT - NEGATIVE GENERAL GENITOURINARY SYMPTOMS
no flank pain L/no urinary hesitancy/no hematuria/no flank pain R/no dysuria/normal urinary frequency

## 2018-05-13 NOTE — PROGRESS NOTE ADULT - ASSESSMENT
89 yo F p/w sob, malaise, fatigue. CXR (+) infiltrate. Vitals: febrile and labs (+) leukocytosis.   Admitted for:   -Sepsis 2/2 CAPNA, concerning for aspiration pna - zosyn and azithro, f/u leg, adjust abx prn. C/w nebs, supportive care, oxygen supplementation, pt. Pulm, cardio, and ID recs appreciated. F/u cultures to final date.    -Essential HTN - holding home antihypertensive rx 2/2 low normal bp, adjust rx prn, monitor vitals   -DVT/GI PPx  -Hypercholesterolemia: c/w home rx

## 2018-05-13 NOTE — H&P ADULT - RS GEN PE MLT RESP DETAILS PC
no chest wall tenderness/no rhonchi/respirations non-labored/clear to auscultation bilaterally/no rales/no wheezes/breath sounds equal

## 2018-05-13 NOTE — H&P ADULT - HISTORY OF PRESENT ILLNESS
89 year old female from lives with her daughter, needs 24 hr Assistance with  ADL's, HHA 12 hrs. Past medical H/o of dementia, HTN, TIA, HLD brought to ED  with C/o chills and fever since 1 day. Patient is minimall verbal, [place], History was given by her daughter. Patient was in her usual health, but this evening she was having chills, and tempt of 102 F at home along with fatigue and weaknes. patient has never been sick or any recent hospitalizations. Patient has one episode of Vomiting and complains of left lower quadrant pain, she has bowel movement yesterday, no diarrhea or constipation.   Denies any headaches, neck pain or rigidity, SOB, cough, chest pain, dysuria or leg pains. Patient denies any sick contacts, flu like symptoms or recent travel.     Allergy NKDA  Social Hx , Non smoker, No ETOH or IVDA   Family Hx No significant family hx 89 year old female from lives with her daughter, needs 24 hr Assistance with  ADL's, HHA 12 hrs. Past medical H/o of dementia, HTN, TIA, HLD brought to ED  with C/o chills and fever since 1 day. Patient is minimall verbal, [place], History was given by her daughter. Patient was in her usual health, but this evening she was having chills, and tempt of 102 F at home along with fatigue and weaknes, and sob. Patient has one episode of Vomiting and complains of left lower quadrant pain, she has bowel movement yesterday, no diarrhea or constipation.   Denies any headaches, neck pain or rigidity, cough, chest pain, dysuria or leg pains. Patient denies any sick contacts, flu like symptoms or recent travel.     Allergy NKDA  Social Hx , Non smoker, No ETOH or IVDA   Family Hx No significant family hx

## 2018-05-13 NOTE — ED ADULT NURSE REASSESSMENT NOTE - NS ED NURSE REASSESS COMMENT FT1
Pt received from [employer]. Family at bedside NAD IV intact
Pt was straight catheterized for urine. Urine sample received and sent to lab.
Pt endorsed to [facility]

## 2018-05-13 NOTE — H&P ADULT - PROBLEM SELECTOR PLAN 1
patient is febrile, with leucocytosis 13K,   meets SIRS criteria of sepsis    UA negative, normal lactate   CXR right lower lobe infiltrate  Likely 2/2 CAP   s/p Zosyn and levofloxcin in ED   f/u urine and blood cultures   f/u RVP and Procalcitonin    c/w Rocephin and Azithromycin for CAP patient is febrile, with leucocytosis 13K,   meets SIRS criteria of sepsis    UA negative, normal lactate   CXR right lower lobe infiltrate  F/u CT abd and Pelvis   Likely 2/2 CAP   s/p Zosyn and levofloxcin in ED   f/u urine and blood cultures   f/u RVP and Procalcitonin    c/w Rocephin and Azithromycin for CAP

## 2018-05-13 NOTE — H&P ADULT - NSHPPHYSICALEXAM_GEN_ALL_CORE
Vital Signs Last 24 Hrs  T(C): 36.4 (13 May 2018 06:57), Max: 39.2 (12 May 2018 19:41)  T(F): 97.5 (13 May 2018 06:57), Max: 102.6 (12 May 2018 19:41)  HR: 63 (13 May 2018 06:57) (63 - 98)  BP: 96/54 (13 May 2018 06:57) (96/54 - 151/77)  BP(mean): --  RR: 16 (13 May 2018 06:57) (16 - 17)  SpO2: 95% (13 May 2018 06:57) (93% - 95%)

## 2018-05-13 NOTE — H&P ADULT - PROBLEM SELECTOR PLAN 2
CXR shows RLL infiltrate CXR shows RLL infiltrate  f/u procalcitonin   f/u strep antigen and blood cultures   Ceftriaxone and Azithromycin

## 2018-05-14 DIAGNOSIS — N17.9 ACUTE KIDNEY FAILURE, UNSPECIFIED: ICD-10-CM

## 2018-05-14 DIAGNOSIS — R33.9 RETENTION OF URINE, UNSPECIFIED: ICD-10-CM

## 2018-05-14 DIAGNOSIS — A41.9 SEPSIS, UNSPECIFIED ORGANISM: ICD-10-CM

## 2018-05-14 DIAGNOSIS — N28.1 CYST OF KIDNEY, ACQUIRED: ICD-10-CM

## 2018-05-14 DIAGNOSIS — N30.00 ACUTE CYSTITIS WITHOUT HEMATURIA: ICD-10-CM

## 2018-05-14 DIAGNOSIS — N17.0 ACUTE KIDNEY FAILURE WITH TUBULAR NECROSIS: ICD-10-CM

## 2018-05-14 DIAGNOSIS — L89.90 PRESSURE ULCER OF UNSPECIFIED SITE, UNSPECIFIED STAGE: ICD-10-CM

## 2018-05-14 LAB
24R-OH-CALCIDIOL SERPL-MCNC: 32.1 NG/ML — SIGNIFICANT CHANGE UP (ref 30–80)
ALBUMIN SERPL ELPH-MCNC: 2.8 G/DL — LOW (ref 3.5–5)
ALP SERPL-CCNC: 62 U/L — SIGNIFICANT CHANGE UP (ref 40–120)
ALT FLD-CCNC: 12 U/L DA — SIGNIFICANT CHANGE UP (ref 10–60)
ANION GAP SERPL CALC-SCNC: 5 MMOL/L — SIGNIFICANT CHANGE UP (ref 5–17)
AST SERPL-CCNC: 28 U/L — SIGNIFICANT CHANGE UP (ref 10–40)
BILIRUB SERPL-MCNC: 0.5 MG/DL — SIGNIFICANT CHANGE UP (ref 0.2–1.2)
BUN SERPL-MCNC: 18 MG/DL — SIGNIFICANT CHANGE UP (ref 7–18)
CALCIUM SERPL-MCNC: 8.3 MG/DL — LOW (ref 8.4–10.5)
CHLORIDE SERPL-SCNC: 108 MMOL/L — SIGNIFICANT CHANGE UP (ref 96–108)
CHOLEST SERPL-MCNC: 172 MG/DL — SIGNIFICANT CHANGE UP (ref 10–199)
CO2 SERPL-SCNC: 25 MMOL/L — SIGNIFICANT CHANGE UP (ref 22–31)
CREAT SERPL-MCNC: 1.53 MG/DL — HIGH (ref 0.5–1.3)
GLUCOSE SERPL-MCNC: 86 MG/DL — SIGNIFICANT CHANGE UP (ref 70–99)
HBA1C BLD-MCNC: 5.6 % — SIGNIFICANT CHANGE UP (ref 4–5.6)
HCT VFR BLD CALC: 30.3 % — LOW (ref 34.5–45)
HDLC SERPL-MCNC: 58 MG/DL — SIGNIFICANT CHANGE UP (ref 40–125)
HGB BLD-MCNC: 9.6 G/DL — LOW (ref 11.5–15.5)
LIPID PNL WITH DIRECT LDL SERPL: 102 MG/DL — SIGNIFICANT CHANGE UP
LYMPHOCYTES # BLD AUTO: 13 % — SIGNIFICANT CHANGE UP (ref 13–44)
MAGNESIUM SERPL-MCNC: 1.9 MG/DL — SIGNIFICANT CHANGE UP (ref 1.6–2.6)
MCHC RBC-ENTMCNC: 28.6 PG — SIGNIFICANT CHANGE UP (ref 27–34)
MCHC RBC-ENTMCNC: 31.5 GM/DL — LOW (ref 32–36)
MCV RBC AUTO: 90.6 FL — SIGNIFICANT CHANGE UP (ref 80–100)
MONOCYTES NFR BLD AUTO: 3 % — SIGNIFICANT CHANGE UP (ref 2–14)
NEUTROPHILS NFR BLD AUTO: 83 % — HIGH (ref 43–77)
PHOSPHATE SERPL-MCNC: 2.5 MG/DL — SIGNIFICANT CHANGE UP (ref 2.5–4.5)
PLATELET # BLD AUTO: 140 K/UL — LOW (ref 150–400)
POTASSIUM SERPL-MCNC: 4.2 MMOL/L — SIGNIFICANT CHANGE UP (ref 3.5–5.3)
POTASSIUM SERPL-SCNC: 4.2 MMOL/L — SIGNIFICANT CHANGE UP (ref 3.5–5.3)
PROCALCITONIN SERPL-MCNC: 26.27 NG/ML — HIGH (ref 0–0.04)
PROT SERPL-MCNC: 6 G/DL — SIGNIFICANT CHANGE UP (ref 6–8.3)
RBC # BLD: 3.35 M/UL — LOW (ref 3.8–5.2)
RBC # FLD: 13.4 % — SIGNIFICANT CHANGE UP (ref 10.3–14.5)
S PNEUM AG SER QL: SIGNIFICANT CHANGE UP
SODIUM SERPL-SCNC: 138 MMOL/L — SIGNIFICANT CHANGE UP (ref 135–145)
TOTAL CHOLESTEROL/HDL RATIO MEASUREMENT: 3 RATIO — LOW (ref 3.3–7.1)
TRIGL SERPL-MCNC: 61 MG/DL — SIGNIFICANT CHANGE UP (ref 10–149)
TSH SERPL-MCNC: 3.17 UU/ML — SIGNIFICANT CHANGE UP (ref 0.34–4.82)
WBC # BLD: 20 K/UL — HIGH (ref 3.8–10.5)
WBC # FLD AUTO: 20 K/UL — HIGH (ref 3.8–10.5)

## 2018-05-14 RX ORDER — DIPHENHYDRAMINE HCL 50 MG
25 CAPSULE ORAL ONCE
Qty: 0 | Refills: 0 | Status: COMPLETED | OUTPATIENT
Start: 2018-05-14 | End: 2018-05-14

## 2018-05-14 RX ORDER — HALOPERIDOL DECANOATE 100 MG/ML
0.5 INJECTION INTRAMUSCULAR ONCE
Qty: 0 | Refills: 0 | Status: COMPLETED | OUTPATIENT
Start: 2018-05-14 | End: 2018-05-14

## 2018-05-14 RX ORDER — PIPERACILLIN AND TAZOBACTAM 4; .5 G/20ML; G/20ML
3.38 INJECTION, POWDER, LYOPHILIZED, FOR SOLUTION INTRAVENOUS EVERY 8 HOURS
Qty: 0 | Refills: 0 | Status: DISCONTINUED | OUTPATIENT
Start: 2018-05-14 | End: 2018-05-17

## 2018-05-14 RX ADMIN — HEPARIN SODIUM 5000 UNIT(S): 5000 INJECTION INTRAVENOUS; SUBCUTANEOUS at 06:05

## 2018-05-14 RX ADMIN — HALOPERIDOL DECANOATE 0.5 MILLIGRAM(S): 100 INJECTION INTRAMUSCULAR at 09:38

## 2018-05-14 RX ADMIN — PANTOPRAZOLE SODIUM 40 MILLIGRAM(S): 20 TABLET, DELAYED RELEASE ORAL at 07:30

## 2018-05-14 RX ADMIN — PIPERACILLIN AND TAZOBACTAM 25 GRAM(S): 4; .5 INJECTION, POWDER, LYOPHILIZED, FOR SOLUTION INTRAVENOUS at 06:05

## 2018-05-14 RX ADMIN — SODIUM CHLORIDE 50 MILLILITER(S): 9 INJECTION INTRAMUSCULAR; INTRAVENOUS; SUBCUTANEOUS at 09:38

## 2018-05-14 RX ADMIN — SODIUM CHLORIDE 50 MILLILITER(S): 9 INJECTION INTRAMUSCULAR; INTRAVENOUS; SUBCUTANEOUS at 22:39

## 2018-05-14 RX ADMIN — Medication 1 TABLET(S): at 11:36

## 2018-05-14 RX ADMIN — PIPERACILLIN AND TAZOBACTAM 25 GRAM(S): 4; .5 INJECTION, POWDER, LYOPHILIZED, FOR SOLUTION INTRAVENOUS at 14:01

## 2018-05-14 RX ADMIN — Medication 3 MILLILITER(S): at 09:19

## 2018-05-14 RX ADMIN — Medication 3 MILLILITER(S): at 03:02

## 2018-05-14 RX ADMIN — HEPARIN SODIUM 5000 UNIT(S): 5000 INJECTION INTRAVENOUS; SUBCUTANEOUS at 17:39

## 2018-05-14 RX ADMIN — Medication 3 MILLILITER(S): at 15:56

## 2018-05-14 RX ADMIN — AZITHROMYCIN 250 MILLIGRAM(S): 500 TABLET, FILM COATED ORAL at 02:17

## 2018-05-14 RX ADMIN — PIPERACILLIN AND TAZOBACTAM 25 GRAM(S): 4; .5 INJECTION, POWDER, LYOPHILIZED, FOR SOLUTION INTRAVENOUS at 22:38

## 2018-05-14 RX ADMIN — Medication 25 MILLIGRAM(S): at 02:16

## 2018-05-14 RX ADMIN — Medication 3 MILLILITER(S): at 21:52

## 2018-05-14 NOTE — PROGRESS NOTE ADULT - ASSESSMENT
89 year old female from lives with her daughter, needs 24 hr Assistance with  ADL's, HHA 12 hrs. Past medical H/o of dementia, HTN, TIA, HLD brought to ED  with C/o chills and fever since 1 day. Patient is minimall verbal, [place], History was given by her daughter. Patient was in her usual health, but this evening she was having chills, and tempt of 102 F at home along with fatigue and weaknes, sob, c.p, one episode of Vomiting and complains of left lower quadrant pain, she has bowel movement yesterday, no diarrhea or constipation.   Denies any headaches, neck pain or rigidity, SOB, cough, chest pain, dysuria or leg pains. Patient denies any sick contacts, flu like symptoms or recent travel.     In ED patient was febrile Tmax 102.6, vitals HR 92, /77, RR 16 and O2 sat 94 at room air, Leucocytosis, EKG NSR no ST t wave changes.     Patient is admitted to medicine floor for Sepsis , likely from Pneumonia.

## 2018-05-14 NOTE — PROGRESS NOTE ADULT - ATTENDING COMMENTS
89 yo F p/w sob, malaise, fatigue. CXR (+) infiltrate. Vitals: febrile and labs (+) leukocytosis.   Admitted for:   -Sepsis 2/2 CAPNA - c/w abx, nebs, supportive care, oxygen supplementation, pt. Pulm, cardio, and ID recs appreciated. F/u cultures to final date.    -MIN vs CKD vs Acute on Chronic - stage 3, avoid nephrotoxic rx, nephro on board.  -Essential HTN - holding home antihypertensive rx 2/2 low normal bp, adjust rx prn, monitor vitals   -DVT/GI PPx  -Hypercholesterolemia: c/w home rx

## 2018-05-14 NOTE — CONSULT NOTE ADULT - SUBJECTIVE AND OBJECTIVE BOX
CHIEF COMPLAINT: Patient is a 89y old  Female who presents with a chief complaint of chills and fever (13 May 2018 00:10)      HPI:  89 year old female from lives with her daughter, needs 24 hr Assistance with  ADL's, HHA 12 hrs. Past medical H/o of dementia, HTN, TIA, HLD brought to ED  with C/o chills and fever since 1 day. Patient is minimall verbal, [place], History was given by her daughter. Patient was in her usual health, but this evening she was having chills, and tempt of 102 F at home along with fatigue and weaknes, and sob. Patient has one episode of Vomiting and complains of left lower quadrant pain, she has bowel movement yesterday, no diarrhea or constipation.   Denies any headaches, neck pain or rigidity, cough, chest pain, dysuria or leg pains. Patient denies any sick contacts, flu like symptoms or recent travel.     Allergy NKDA  Social Hx , Non smoker, No ETOH or IVDA   Family Hx No significant family hx (13 May 2018 00:10)   Patient seen and examined.     PAST MEDICAL & SURGICAL HISTORY:  TIA (transient ischemic attack)  Hypercholesterolemia  HTN (hypertension)  No significant past surgical history      Allergies    No Known Allergies    Intolerances        MEDICATIONS  (STANDING):  ALBUTerol/ipratropium for Nebulization 3 milliLiter(s) Nebulizer every 6 hours  heparin  Injectable 5000 Unit(s) SubCutaneous every 12 hours  multivitamin/minerals 1 Tablet(s) Oral daily  pantoprazole    Tablet 40 milliGRAM(s) Oral before breakfast  piperacillin/tazobactam IVPB. 3.375 Gram(s) IV Intermittent every 8 hours  simvastatin 10 milliGRAM(s) Oral at bedtime  sodium chloride 0.9%. 1000 milliLiter(s) (50 mL/Hr) IV Continuous <Continuous>      MEDICATIONS  (PRN):  acetaminophen   Tablet 650 milliGRAM(s) Oral every 6 hours PRN For Temp greater than 38 C (100.4 F)  acetaminophen   Tablet. 650 milliGRAM(s) Oral every 6 hours PRN Mild Pain (1 - 3)   Medications up to date at time of exam.    FAMILY HISTORY:      SOCIAL HISTORY  Smoking History: [   ] smoking/smoke exposure, [   ] former smoker, [  ] denies smoking  Living Condition: [   ] apartment, [   ] private house  Work History:   Travel History: denies recent travel  Illicit Substance Use: denies  Alcohol Use: denies    REVIEW OF SYSTEMS:    CONSTITUTIONAL:  denies fevers, chills, sweats, weight loss    HEENT:  denies diplopia or blurred vision, sore throat or runny nose.    CARDIOVASCULAR:  denies pressure, squeezing, tightness, or heaviness about the chest; no palpitations.    RESPIRATORY:  denies SOB, cough, BYERS, wheezing.    GASTROINTESTINAL:  denies abdominal pain, nausea, vomiting or diarrhea.    GENITOURINARY: denies dysuria, frequency or urgency.    NEUROLOGIC:  denies numbness, tingling, seizures or weakness.    PSYCHIATRIC:  denies disorder of thought or mood.    MSK: denies swelling, redness      PHYSICAL EXAMINATION:    GENERAL: The patient is a well-developed, well-nourished, in no apparent distress.     Vital Signs Last 24 Hrs  T(C): 36.3 (14 May 2018 09:04), Max: 37.2 (13 May 2018 20:21)  T(F): 97.4 (14 May 2018 09:04), Max: 99 (13 May 2018 20:21)  HR: 95 (14 May 2018 09:04) (71 - 95)  BP: 136/66 (14 May 2018 09:04) (112/57 - 136/66)  BP(mean): --  RR: 18 (14 May 2018 09:04) (17 - 20)  SpO2: 96% (14 May 2018 09:04) (96% - 99%)   (if applicable)    Chest Tube (if applicable)    HEENT: Head is normocephalic and atraumatic. .    NECK: Supple, no palpable adenopathy.    LUNGS: Clear to auscultation, no wheezing, rales, or rhonchi.    HEART: Regular rate and rhythm without murmur.    ABDOMEN: Soft, nontender, and nondistended.  No hepatosplenomegaly is noted.    EXTREMITIES: Without any cyanosis, clubbing, rash, lesions or edema.    NEUROLOGIC: Awake, alert.    SKIN: Warm, dry, good turgor.      LABS:                        9.6    20.0  )-----------( 140      ( 14 May 2018 06:34 )             30.3     05-14    138  |  108  |  18  ----------------------------<  86  4.2   |  25  |  1.53<H>    Ca    8.3<L>      14 May 2018 06:34  Phos  2.5     -  Mg     1.9     -    TPro  6.0  /  Alb  2.8<L>  /  TBili  0.5  /  DBili  x   /  AST  28  /  ALT  12  /  AlkPhos  62  -    PT/INR - ( 12 May 2018 20:23 )   PT: 11.0 sec;   INR: 1.01 ratio         PTT - ( 12 May 2018 20:23 )  PTT:27.4 sec  Urinalysis Basic - ( 12 May 2018 21:09 )    Color: Yellow / Appearance: Clear / S.010 / pH: x  Gluc: x / Ketone: Negative  / Bili: Negative / Urobili: Negative   Blood: x / Protein: Negative / Nitrite: Positive   Leuk Esterase: Small / RBC: 2-5 /HPF / WBC 3-5 /HPF   Sq Epi: x / Non Sq Epi: Occasional /HPF / Bacteria: TNTC /HPF              Serum Pro-Brain Natriuretic Peptide: 789 pg/mL (18 @ 20:23)      Procalcitonin, Serum: 26.27 ng/mL (18 @ 08:34)      MICROBIOLOGY: (if applicable)    RADIOLOGY & ADDITIONAL STUDIES:  EKG:   CXR:  ECHO:    IMPRESSION: 89y Female PAST MEDICAL & SURGICAL HISTORY:  TIA (transient ischemic attack)  Hypercholesterolemia  HTN (hypertension)  No significant past surgical history         89 year old female from lives with her daughter, needs 24 hr Assistance with  ADL's, HHA 12 hrs. Past medical H/o of dementia, HTN, TIA, HLD brought to ED with c/o chills and fever     RECOMMENDATIONS: CHIEF COMPLAINT: Patient is a 89y old  Female who presents with a chief complaint of chills and fever (13 May 2018 00:10)      HPI:  89 year old female from lives with her daughter, needs 24 hr Assistance with  ADL's, HHA 12 hrs. Past medical H/o of dementia, HTN, TIA, HLD brought to ED  with C/o chills and fever since 1 day. Patient is minimall verbal, [place], History was given by her daughter. Patient was in her usual health, but this evening she was having chills, and tempt of 102 F at home along with fatigue and weaknes, and sob. Patient has one episode of Vomiting and complains of left lower quadrant pain, she has bowel movement yesterday, no diarrhea or constipation.   Denies any headaches, neck pain or rigidity, cough, chest pain, dysuria or leg pains. Patient denies any sick contacts, flu like symptoms or recent travel.     Allergy NKDA  Social Hx , Non smoker, No ETOH or IVDA   Family Hx No significant family hx (13 May 2018 00:10)   Patient seen and examined.     PAST MEDICAL & SURGICAL HISTORY:  TIA (transient ischemic attack)  Hypercholesterolemia  HTN (hypertension)  No significant past surgical history      Allergies    No Known Allergies    Intolerances        MEDICATIONS  (STANDING):  ALBUTerol/ipratropium for Nebulization 3 milliLiter(s) Nebulizer every 6 hours  heparin  Injectable 5000 Unit(s) SubCutaneous every 12 hours  multivitamin/minerals 1 Tablet(s) Oral daily  pantoprazole    Tablet 40 milliGRAM(s) Oral before breakfast  piperacillin/tazobactam IVPB. 3.375 Gram(s) IV Intermittent every 8 hours  simvastatin 10 milliGRAM(s) Oral at bedtime  sodium chloride 0.9%. 1000 milliLiter(s) (50 mL/Hr) IV Continuous <Continuous>      MEDICATIONS  (PRN):  acetaminophen   Tablet 650 milliGRAM(s) Oral every 6 hours PRN For Temp greater than 38 C (100.4 F)  acetaminophen   Tablet. 650 milliGRAM(s) Oral every 6 hours PRN Mild Pain (1 - 3)   Medications up to date at time of exam.    FAMILY HISTORY:      SOCIAL HISTORY  Smoking History: [ x  ] smoking/smoke exposure second hand smoke exposure from daughter, [   ] former smoker, [  ] denies smoking  Living Condition: [   ] apartment, [   ] private house  Work History:   Travel History: denies recent travel  Illicit Substance Use: denies  Alcohol Use: denies    REVIEW OF SYSTEMS: unable to fully obtain     CONSTITUTIONAL:  denies fevers, chills, sweats, weight loss    HEENT:  denies diplopia or blurred vision, sore throat or runny nose.    CARDIOVASCULAR:  denies pressure, squeezing, tightness, or heaviness about the chest; no palpitations.    RESPIRATORY:  denies SOB, cough, BYERS, wheezing.    GASTROINTESTINAL:  denies abdominal pain, nausea, vomiting or diarrhea.    GENITOURINARY: denies dysuria, frequency or urgency.    NEUROLOGIC:  denies numbness, tingling, seizures or weakness.    PSYCHIATRIC:  denies disorder of thought or mood.    MSK: denies swelling, redness      PHYSICAL EXAMINATION: non verbal    GENERAL: The patient is a well-developed, well-nourished, in no apparent distress.     Vital Signs Last 24 Hrs  T(C): 36.3 (14 May 2018 09:04), Max: 37.2 (13 May 2018 20:21)  T(F): 97.4 (14 May 2018 09:04), Max: 99 (13 May 2018 20:21)  HR: 95 (14 May 2018 09:04) (71 - 95)  BP: 136/66 (14 May 2018 09:04) (112/57 - 136/66)  BP(mean): --  RR: 18 (14 May 2018 09:04) (17 - 20)  SpO2: 96% (14 May 2018 09:04) (96% - 99%)   (if applicable)    Chest Tube (if applicable)    HEENT: Head is normocephalic and atraumatic. .    NECK: Supple, no palpable adenopathy.    LUNGS: Clear to auscultation, no wheezing, rales, or rhonchi.    HEART: Regular rate and rhythm without murmur.    ABDOMEN: Soft, nontender, and nondistended.  No hepatosplenomegaly is noted.    EXTREMITIES: Without any cyanosis, clubbing, rash, lesions or edema.    NEUROLOGIC: lethargic     SKIN: Warm, dry, good turgor.      LABS:                        9.6    20.0  )-----------( 140      ( 14 May 2018 06:34 )             30.3     05-14    138  |  108  |  18  ----------------------------<  86  4.2   |  25  |  1.53<H>    Ca    8.3<L>      14 May 2018 06:34  Phos  2.5       Mg     1.9         TPro  6.0  /  Alb  2.8<L>  /  TBili  0.5  /  DBili  x   /  AST  28  /  ALT  12  /  AlkPhos  62  -    PT/INR - ( 12 May 2018 20:23 )   PT: 11.0 sec;   INR: 1.01 ratio         PTT - ( 12 May 2018 20:23 )  PTT:27.4 sec  Urinalysis Basic - ( 12 May 2018 21:09 )    Color: Yellow / Appearance: Clear / S.010 / pH: x  Gluc: x / Ketone: Negative  / Bili: Negative / Urobili: Negative   Blood: x / Protein: Negative / Nitrite: Positive   Leuk Esterase: Small / RBC: 2-5 /HPF / WBC 3-5 /HPF   Sq Epi: x / Non Sq Epi: Occasional /HPF / Bacteria: TNTC /HPF              Serum Pro-Brain Natriuretic Peptide: 789 pg/mL (18 @ 20:23)      Procalcitonin, Serum: 26.27 ng/mL (18 @ 08:34)      MICROBIOLOGY: (if applicable)    RADIOLOGY & ADDITIONAL STUDIES:  EKG:   CXR:  < from: Xray Chest 1 View AP/PA (18 @ 20:07) >    EXAM:  XR CHEST AP OR PA 1V                            PROCEDURE DATE:  2018          INTERPRETATION:  CLINICAL STATEMENT: Chest Pain.    TECHNIQUE: AP view of the chest.    COMPARISON: None available.    FINDINGS/  IMPRESSION:  Study limiteddue to rotation. Overlying chin obscures right lung apex.    No gross consolidation or pleural effusion.    Chronic rib deformities. Chronic right clavicle deformity.    Nonspecific small nodular density overlies right upper lung zone.   Nonemergent CT chest can be obtained. If clinically warranted    Heart size cannot be accurately assessed in this projection.                  MILLIE IBARRA M.D., ATTENDING RADIOLOGIST  This document has been electronically signed. May 13 2018 10:00AM                < end of copied text >    ECHO:    IMPRESSION: 89y Female PAST MEDICAL & SURGICAL HISTORY:  TIA (transient ischemic attack)  Hypercholesterolemia  HTN (hypertension)  No significant past surgical history         89 year old female from lives with her daughter, needs 24 hr Assistance with  ADL's, HHA 12 hrs. Past medical H/o of dementia, HTN, TIA, HLD brought to ED with c/o chills, fever, SOB due to UTI. RVP negative    + UTI e.coli  +leukocytosis 20  +procalcitonin 26  +MIN    RECOMMENDATIONS:   - abx   - con't with bronchodilators, o2 supplement as needed.    - aspiration precautions   - DVT and GI prophylaxis. CHIEF COMPLAINT: Patient is a 89y old  Female who presents with a chief complaint of chills and fever (13 May 2018 00:10)      HPI:  89 year old female from lives with her daughter, needs 24 hr Assistance with  ADL's, HHA 12 hrs. Past medical H/o of dementia, HTN, TIA, HLD brought to ED  with C/o chills and fever since 1 day. Patient is minimall verbal, [place], History was given by her daughter. Patient was in her usual health, but this evening she was having chills, and tempt of 102 F at home along with fatigue and weaknes, and sob. Patient has one episode of Vomiting and complains of left lower quadrant pain, she has bowel movement yesterday, no diarrhea or constipation.   Denies any headaches, neck pain or rigidity, cough, chest pain, dysuria or leg pains. Patient denies any sick contacts, flu like symptoms or recent travel.     Allergy NKDA  Social Hx , Non smoker, No ETOH or IVDA   Family Hx No significant family hx (13 May 2018 00:10)   Patient seen and examined.     PAST MEDICAL & SURGICAL HISTORY:  TIA (transient ischemic attack)  Hypercholesterolemia  HTN (hypertension)  No significant past surgical history      Allergies    No Known Allergies    Intolerances        MEDICATIONS  (STANDING):  ALBUTerol/ipratropium for Nebulization 3 milliLiter(s) Nebulizer every 6 hours  heparin  Injectable 5000 Unit(s) SubCutaneous every 12 hours  multivitamin/minerals 1 Tablet(s) Oral daily  pantoprazole    Tablet 40 milliGRAM(s) Oral before breakfast  piperacillin/tazobactam IVPB. 3.375 Gram(s) IV Intermittent every 8 hours  simvastatin 10 milliGRAM(s) Oral at bedtime  sodium chloride 0.9%. 1000 milliLiter(s) (50 mL/Hr) IV Continuous <Continuous>      MEDICATIONS  (PRN):  acetaminophen   Tablet 650 milliGRAM(s) Oral every 6 hours PRN For Temp greater than 38 C (100.4 F)  acetaminophen   Tablet. 650 milliGRAM(s) Oral every 6 hours PRN Mild Pain (1 - 3)   Medications up to date at time of exam.    FAMILY HISTORY:      SOCIAL HISTORY  Smoking History: [ x  ] smoking/smoke exposure second hand smoke exposure from daughter, [   ] former smoker, [  ] denies smoking  Living Condition: [   ] apartment, [   ] private house  Work History:   Travel History: denies recent travel  Illicit Substance Use: denies  Alcohol Use: denies    REVIEW OF SYSTEMS: unable to fully obtain     CONSTITUTIONAL:  denies fevers, chills, sweats, weight loss    HEENT:  denies diplopia or blurred vision, sore throat or runny nose.    CARDIOVASCULAR:  denies pressure, squeezing, tightness, or heaviness about the chest; no palpitations.    RESPIRATORY:  denies SOB, cough, BYERS, wheezing.    GASTROINTESTINAL:  denies abdominal pain, nausea, vomiting or diarrhea.    GENITOURINARY: denies dysuria, frequency or urgency.    NEUROLOGIC:  denies numbness, tingling, seizures or weakness.    PSYCHIATRIC:  denies disorder of thought or mood.    MSK: denies swelling, redness      PHYSICAL EXAMINATION: non verbal    GENERAL: The patient is a well-developed, well-nourished, in no apparent distress.     Vital Signs Last 24 Hrs  T(C): 36.3 (14 May 2018 09:04), Max: 37.2 (13 May 2018 20:21)  T(F): 97.4 (14 May 2018 09:04), Max: 99 (13 May 2018 20:21)  HR: 95 (14 May 2018 09:04) (71 - 95)  BP: 136/66 (14 May 2018 09:04) (112/57 - 136/66)  BP(mean): --  RR: 18 (14 May 2018 09:04) (17 - 20)  SpO2: 96% (14 May 2018 09:04) (96% - 99%)   (if applicable)    Chest Tube (if applicable)    HEENT: Head is normocephalic and atraumatic. .    NECK: Supple, no palpable adenopathy.    LUNGS: Clear to auscultation, no wheezing, rales, or rhonchi.    HEART: Regular rate and rhythm without murmur.    ABDOMEN: Soft, nontender, and nondistended.  No hepatosplenomegaly is noted.    EXTREMITIES: Without any cyanosis, clubbing, rash, lesions or edema.    NEUROLOGIC: lethargic     SKIN: Warm, dry, good turgor.      LABS:                        9.6    20.0  )-----------( 140      ( 14 May 2018 06:34 )             30.3     05-14    138  |  108  |  18  ----------------------------<  86  4.2   |  25  |  1.53<H>    Ca    8.3<L>      14 May 2018 06:34  Phos  2.5       Mg     1.9         TPro  6.0  /  Alb  2.8<L>  /  TBili  0.5  /  DBili  x   /  AST  28  /  ALT  12  /  AlkPhos  62  -    PT/INR - ( 12 May 2018 20:23 )   PT: 11.0 sec;   INR: 1.01 ratio         PTT - ( 12 May 2018 20:23 )  PTT:27.4 sec  Urinalysis Basic - ( 12 May 2018 21:09 )    Color: Yellow / Appearance: Clear / S.010 / pH: x  Gluc: x / Ketone: Negative  / Bili: Negative / Urobili: Negative   Blood: x / Protein: Negative / Nitrite: Positive   Leuk Esterase: Small / RBC: 2-5 /HPF / WBC 3-5 /HPF   Sq Epi: x / Non Sq Epi: Occasional /HPF / Bacteria: TNTC /HPF              Serum Pro-Brain Natriuretic Peptide: 789 pg/mL (18 @ 20:23)      Procalcitonin, Serum: 26.27 ng/mL (18 @ 08:34)      MICROBIOLOGY: (if applicable)    RADIOLOGY & ADDITIONAL STUDIES:  EKG:   CXR:  < from: Xray Chest 1 View AP/PA (18 @ 20:07) >    EXAM:  XR CHEST AP OR PA 1V                            PROCEDURE DATE:  2018          INTERPRETATION:  CLINICAL STATEMENT: Chest Pain.    TECHNIQUE: AP view of the chest.    COMPARISON: None available.    FINDINGS/  IMPRESSION:  Study limiteddue to rotation. Overlying chin obscures right lung apex.    No gross consolidation or pleural effusion.    Chronic rib deformities. Chronic right clavicle deformity.    Nonspecific small nodular density overlies right upper lung zone.   Nonemergent CT chest can be obtained. If clinically warranted    Heart size cannot be accurately assessed in this projection.                  MILLIE IBARRA M.D., ATTENDING RADIOLOGIST  This document has been electronically signed. May 13 2018 10:00AM                < end of copied text >    ECHO:    IMPRESSION: 89y Female PAST MEDICAL & SURGICAL HISTORY:  TIA (transient ischemic attack)  Hypercholesterolemia  HTN (hypertension)  No significant past surgical history         89 year old female from lives with her daughter, needs 24 hr Assistance with  ADL's, HHA 12 hrs. Past medical H/o of dementia, HTN, TIA, HLD brought to ED with c/o chills, fever, SOB due to UTI. RVP negative    + UTI e.coli  +leukocytosis 20  +procalcitonin 26  +MIN    RECOMMENDATIONS:   - abx   - con't with bronchodilators, o2 supplement as needed.    - aspiration precautions   - DVT and GI prophylaxis.     Agree with above assessment and plan as transcribed.

## 2018-05-14 NOTE — PROGRESS NOTE ADULT - ATTENDING COMMENTS
Patient seen and examined, agree with above assessment and plan as transcribed above.    - no need for cardiac imaging  - I will sign off please call back if needed    David No MD, FACC

## 2018-05-14 NOTE — PROGRESS NOTE ADULT - ASSESSMENT
She is a 89 yo female with dementia, HTN, hyperlipidemia and a prior TIA, brought in to the ER from home for evaluation of chills and fever.  On admission, she found to have fever, Leukocytosis, and mildly positive urine analysis. The RVP and CXR is Negative.  She has started on Zosyn and The ID consult requested to assist with further evaluation and antibiotic management.     # Sepsis  # UTI  - E.coli    Would recommend:  1. Follow up final Urine culture for sensitivity of E.coli  2. Monitor WBC count, is trending up  3. Change Zosyn to meropenem until sensitivity of E.coli  4. Aspiration precaution    d/w Family at the bed side and Nursing staff She is a 89 yo female with dementia, HTN, hyperlipidemia and a prior TIA, brought in to the ER from home for evaluation of chills and fever.  On admission, she found to have fever, Leukocytosis, and mildly positive urine analysis. The RVP and CXR is Negative.  She has started on Zosyn and The ID consult requested to assist with further evaluation and antibiotic management.     # Sepsis  # UTI  - E.coli    Would recommend:    1. Follow up final Urine culture for sensitivity of E.coli  2. Monitor WBC count, is trending up  3. Change Zosyn to meropenem until sensitivity of E.coli is available   4. Aspiration precaution    d/w  Nursing staff She is a 89 yo female with dementia, HTN, hyperlipidemia and a prior TIA, brought in to the ER from home for evaluation of chills and fever.  On admission, she found to have fever, Leukocytosis, and mildly positive urine analysis. The RVP and CXR is Negative.  She has started on Zosyn and The ID consult requested to assist with further evaluation and antibiotic management.     # Sepsis  # UTI  - E.coli    Would recommend:    1. Follow up final Urine culture for sensitivity of E.coli  2. Monitor WBC count, is trending up  3. Change Ceftriaxone to Zosyn until sensitivity of E.coli is available   4. Aspiration precaution    d/w  Nursing staff

## 2018-05-14 NOTE — PROGRESS NOTE ADULT - SUBJECTIVE AND OBJECTIVE BOX
Patient seen and examined at bedside, with daughter at bedside  No acute events noted overnight  Case discussed with medical team    HPI:  89 year old female from lives with her daughter, needs 24 hr Assistance with  ADL's, HHA 12 hrs. Past medical H/o of dementia, HTN, TIA, HLD brought to ED  with C/o chills and fever since 1 day. Patient is minimall verbal, [place], History was given by her daughter. Patient was in her usual health, but this evening she was having chills, and tempt of 102 F at home along with fatigue and weaknes, and sob. Patient has one episode of Vomiting and complains of left lower quadrant pain, she has bowel movement yesterday, no diarrhea or constipation.   Denies any headaches, neck pain or rigidity, cough, chest pain, dysuria or leg pains. Patient denies any sick contacts, flu like symptoms or recent travel.     Allergy NKDA  Social Hx , Non smoker, No ETOH or IVDA   Family Hx No significant family hx (13 May 2018 00:10)      PAST MEDICAL & SURGICAL HISTORY:  TIA (transient ischemic attack)  Hypercholesterolemia  HTN (hypertension)  No significant past surgical history      No Known Allergies       MEDICATIONS  (STANDING):  ALBUTerol/ipratropium for Nebulization 3 milliLiter(s) Nebulizer every 6 hours  azithromycin  IVPB      azithromycin  IVPB 500 milliGRAM(s) IV Intermittent every 24 hours  heparin  Injectable 5000 Unit(s) SubCutaneous every 12 hours  multivitamin/minerals 1 Tablet(s) Oral daily  pantoprazole    Tablet 40 milliGRAM(s) Oral before breakfast  piperacillin/tazobactam IVPB. 3.375 Gram(s) IV Intermittent every 8 hours  simvastatin 10 milliGRAM(s) Oral at bedtime  sodium chloride 0.9%. 1000 milliLiter(s) (50 mL/Hr) IV Continuous <Continuous>    MEDICATIONS  (PRN):  acetaminophen   Tablet 650 milliGRAM(s) Oral every 6 hours PRN For Temp greater than 38 C (100.4 F)  acetaminophen   Tablet. 650 milliGRAM(s) Oral every 6 hours PRN Mild Pain (1 - 3)      REVIEW OF SYSTEMS:  CONSTITUTIONAL: (+) malaise.   EYES: No acute change in vision   ENT:  No tinnitus  NECK: No stiffness  RESPIRATORY: cough/sob. No hemoptysis  CARDIOVASCULAR: No chest pain, palpitations, syncope  GASTROINTESTINAL: No hematemesis, diarrhea, melena, or hematochezia.  GENITOURINARY: No hematuria  NEUROLOGICAL: No headaches  LYMPH Nodes: No enlarged glands  ENDOCRINE: No heat or cold intolerance	    T(C): 36.3 (18 @ 09:04), Max: 37.2 (18 @ 15:53)  HR: 95 (18 @ 09:04) (71 - 95)  BP: 136/66 (18 @ 09:04) (97/45 - 136/66)  RR: 18 (18 @ 09:04) (16 - 20)  SpO2: 96% (18 @ 09:04) (96% - 99%)    PHYSICAL EXAMINATION:   Constitutional: WD, NAD  HEENT: NC, AT  Neck:  Supple  Respiratory:  stable rhonchi. Adequate airflow b/l. Not using accessory muscles of respiration.  Cardiovascular:  S1 & S2 intact, no R/G, 2+ radial pulses b/l  Gastrointestinal: Soft, NT, ND, normoactive b.s., no organomegaly/RT/rigidity  Extremities: WWP  Neurological:  Alert and awake.  No acute focal motor deficits. Crude sensation intact.     Labs and imaging reviewed    LABS:                        9.6    20.0  )-----------( 140      ( 14 May 2018 06:34 )             30.3     -    138  |  108  |  18  ----------------------------<  86  4.2   |  25  |  1.53<H>    Ca    8.3<L>      14 May 2018 06:34  Phos  2.5       Mg     1.9         TPro  6.0  /  Alb  2.8<L>  /  TBili  0.5  /  DBili  x   /  AST  28  /  ALT  12  /  AlkPhos  62  -14        PT/INR - ( 12 May 2018 20:23 )   PT: 11.0 sec;   INR: 1.01 ratio         PTT - ( 12 May 2018 20:23 )  PTT:27.4 sec  Urinalysis Basic - ( 12 May 2018 21:09 )    Color: Yellow / Appearance: Clear / S.010 / pH: x  Gluc: x / Ketone: Negative  / Bili: Negative / Urobili: Negative   Blood: x / Protein: Negative / Nitrite: Positive   Leuk Esterase: Small / RBC: 2-5 /HPF / WBC 3-5 /HPF   Sq Epi: x / Non Sq Epi: Occasional /HPF / Bacteria: TNTC /HPF      CAPILLARY BLOOD GLUCOSE            LIVER FUNCTIONS - ( 14 May 2018 06:34 )  Alb: 2.8 g/dL / Pro: 6.0 g/dL / ALK PHOS: 62 U/L / ALT: 12 U/L DA / AST: 28 U/L / GGT: x               RADIOLOGY & ADDITIONAL STUDIES:

## 2018-05-14 NOTE — CONSULT NOTE ADULT - SUBJECTIVE AND OBJECTIVE BOX
[facility] NEPHROLOGY- CONSULTATION NOTE    Patient is a 89y Female who presented to the hospital with feverchills  1 day and 1 episode of vomiting was found to have UTI/urosepsis.  Pt s/p abx, looks ill still per daughter at bedside.  Pt has dementia and is unable to give history.  Pt had IV contrast CT yesterday.  She had an order for Toradol prn, but it was not given. Pt was hypotensive in the ED with low SBP 89.  No ACEi/ARBs/Diuretics.    PAST MEDICAL & SURGICAL HISTORY:  TIA (transient ischemic attack)  Hypercholesterolemia  HTN (hypertension)  No significant past surgical history    No Known Allergies    Home Medications Reviewed  Hospital Medications:   MEDICATIONS  (STANDING):  ALBUTerol/ipratropium for Nebulization 3 milliLiter(s) Nebulizer every 6 hours  azithromycin  IVPB      azithromycin  IVPB 500 milliGRAM(s) IV Intermittent every 24 hours  haloperidol    Injectable 0.5 milliGRAM(s) IV Push once  heparin  Injectable 5000 Unit(s) SubCutaneous every 12 hours  multivitamin/minerals 1 Tablet(s) Oral daily  pantoprazole    Tablet 40 milliGRAM(s) Oral before breakfast  piperacillin/tazobactam IVPB. 3.375 Gram(s) IV Intermittent every 8 hours  simvastatin 10 milliGRAM(s) Oral at bedtime  sodium chloride 0.9%. 1000 milliLiter(s) (50 mL/Hr) IV Continuous <Continuous>    SOCIAL HISTORY:  Denies ETOh,Smoking,   FAMILY HISTORY:    REVIEW OF SYSTEMS: unable to obtain    VITALS:  T(F): 97.4 (18 @ 09:04), Max: 99 (18 @ 15:53)  HR: 95 (18 @ 09:04)  BP: 136/66 (18 @ 09:04)  RR: 18 (18 @ 09:04)  SpO2: 96% (18 @ 09:04)  Wt(kg): --      PHYSICAL EXAM:  Constitutional: NAD  HEENT: anicteric sclera, oropharynx clear, MMM  Neck: No JVD  Respiratory: CTAB, no wheezes, rales or rhonchi  Cardiovascular: S1, S2, RRR  Gastrointestinal: BS+, soft, NT/ND, ? Lower abd constipation vs. distended bladder?  Extremities: No cyanosis or clubbing. No peripheral edema  Neurological: Awake, minimally alert.  : No CVA tenderness. No trejo.  ?distended bladder?   Skin: No rashes      LABS:      138  |  108  |  18  ----------------------------<  86  4.2   |  25  |  1.53<H>    Ca    8.3<L>      14 May 2018 06:34  Phos  2.5       Mg     1.9         TPro  6.0  /  Alb  2.8<L>  /  TBili  0.5  /  DBili      /  AST  28  /  ALT  12  /  AlkPhos  62      Creatinine Trend: 1.53 <--, 1.10 <--                        9.6    20.0  )-----------( 140      ( 14 May 2018 06:34 )             30.3     Urine Studies:  Urinalysis Basic - ( 12 May 2018 21:09 )    Color: Yellow / Appearance: Clear / S.010 / pH:   Gluc:  / Ketone: Negative  / Bili: Negative / Urobili: Negative   Blood:  / Protein: Negative / Nitrite: Positive   Leuk Esterase: Small / RBC: 2-5 /HPF / WBC 3-5 /HPF   Sq Epi:  / Non Sq Epi: Occasional /HPF / Bacteria: TNTC /HPF        RADIOLOGY & ADDITIONAL STUDIES:      EXAM:  CT ABDOMEN AND PELVIS OC IC                            PROCEDURE DATE:  2018          INTERPRETATION:  CT of the Abdomen with contrast and CT of the Pelvis   with contrast    HISTORY: Abdominal pain and vomiting. Assess for possible colitis.    TECHNIQUE: Multiple axial scans of the abdomen and pelvis were obtained   after administration of IV and a small amount of bowel contrast material.     Interpretation: Stomach: Wall thickening distally.     Liver: No focal lesions.      Biliary tree: Not dilated.      Solid organs: 4.5 cm cyst of the lower pole of the right kidney. Small   intraparenchymal cyst is seen in the left kidney. The pancreas is   atrophic.      Retroperitoneum: No evidence of lymphadenopathy.      Bowel: No evidence of ascites, bowel obstruction or free air. Colonic   wall thickening is within normal limits.     Appendix: Not definitely seen    Urinary bladder: Partially filled.      Pelvis: Shows small adnexal cysts.      The inguinal regions are unremarkable.      The lung bases show no focal infiltrate.      IMPRESSION: Possible gastritis. Clinical correlation is suggested..    The above study was reviewed by the offsite overnight image reading   service at the time of the examination.    Thank you for  this referral.                  ELDON MELENDEZ M.D., ATTENDING RADIOLOGIST  This document has been electronically signed. May 13 2018  9:24AM

## 2018-05-14 NOTE — ADVANCED PRACTICE NURSE CONSULT - ASSESSMENT
This is a 89yr old female patient admitted for Pneumonia, presenting with the following:  -There is a Sacral Dimple to which there is a closed hole to the area  -There is a Stage 1 Pressure Injury to the L. Elbow and Bilateral Heels; presenting as non-blanchable erythema

## 2018-05-14 NOTE — PROGRESS NOTE ADULT - PROBLEM SELECTOR PLAN 5
pt takes Ezetimibe 10 mg daily   f/u Lipid profile : wnl Likely from Obstruction / ATN in setting of sepsis  Placed trejo today   Monitor uop and monitor serum creatinine   f/u Dr. Nino   Kidney cyst of no consequence given the benign appearance and age of patient.

## 2018-05-14 NOTE — PROGRESS NOTE ADULT - SUBJECTIVE AND OBJECTIVE BOX
Subjective: pt seen and examined, no complaints on exam.   no chest pain .   no events overnight   ROS - .    acetaminophen   Tablet 650 milliGRAM(s) Oral every 6 hours PRN  acetaminophen   Tablet. 650 milliGRAM(s) Oral every 6 hours PRN  ALBUTerol/ipratropium for Nebulization 3 milliLiter(s) Nebulizer every 6 hours  azithromycin  IVPB      azithromycin  IVPB 500 milliGRAM(s) IV Intermittent every 24 hours  heparin  Injectable 5000 Unit(s) SubCutaneous every 12 hours  ketorolac   Injectable 15 milliGRAM(s) IV Push every 8 hours PRN  multivitamin/minerals 1 Tablet(s) Oral daily  pantoprazole    Tablet 40 milliGRAM(s) Oral before breakfast  piperacillin/tazobactam IVPB. 3.375 Gram(s) IV Intermittent every 8 hours  simvastatin 10 milliGRAM(s) Oral at bedtime  sodium chloride 0.9%. 1000 milliLiter(s) IV Continuous <Continuous>                            9.6    20.0  )-----------( 140      ( 14 May 2018 06:34 )             30.3       Hemoglobin: 9.6 g/dL (05-14 @ 06:34)  Hemoglobin: 11.8 g/dL (05-12 @ 20:23)      05-14    138  |  108  |  18  ----------------------------<  86  4.2   |  25  |  1.53<H>    Ca    8.3<L>      14 May 2018 06:34  Phos  2.5     05-14  Mg     1.9     05-14    TPro  6.0  /  Alb  2.8<L>  /  TBili  0.5  /  DBili  x   /  AST  28  /  ALT  12  /  AlkPhos  62  05-14    Creatinine Trend: 1.53<--, 1.10<--    COAGS:           T(C): 37 (05-14-18 @ 08:13), Max: 37.2 (05-13-18 @ 15:53)  HR: 91 (05-14-18 @ 08:13) (71 - 91)  BP: 129/81 (05-14-18 @ 08:13) (97/45 - 129/81)  RR: 17 (05-14-18 @ 08:13) (16 - 20)  SpO2: 99% (05-14-18 @ 08:13) (97% - 99%)  Wt(kg): --    I&O's Summary    no JVD  RRR, no murmurs  CTAB  soft nt/nd  no c/c/e    TELEMETRY: none 	      DIAGNOSTIC TESTING:  [ ] Echocardiogram:   [ ]  Catheterization:  [ ] Stress Test:    OTHER: 	        ASSESSMENT/PLAN: 	89y Female  PMH dementia, HTN, hyperlipidemia and a prior TIA. She is now admitted with fever r/o sepsis. Cardiology is called because her EKG shows a first degree AV delay. The QRS is narrow. There is no history of syncope, nor prior cardiac workup on file.    Pt pan cultured yesterday, cont ABX broad spectrum till culture are resulted   GI / DVT prophylaxis.   keep K>4, mag >2.0  statin   supportive care.   further plans post culture  , +/- TTE.   D/W Dr No Subjective: pt seen and examined, no complaints on exam.   no chest pain .   no events overnight   ROS - .    acetaminophen   Tablet 650 milliGRAM(s) Oral every 6 hours PRN  acetaminophen   Tablet. 650 milliGRAM(s) Oral every 6 hours PRN  ALBUTerol/ipratropium for Nebulization 3 milliLiter(s) Nebulizer every 6 hours  azithromycin  IVPB      azithromycin  IVPB 500 milliGRAM(s) IV Intermittent every 24 hours  heparin  Injectable 5000 Unit(s) SubCutaneous every 12 hours  ketorolac   Injectable 15 milliGRAM(s) IV Push every 8 hours PRN  multivitamin/minerals 1 Tablet(s) Oral daily  pantoprazole    Tablet 40 milliGRAM(s) Oral before breakfast  piperacillin/tazobactam IVPB. 3.375 Gram(s) IV Intermittent every 8 hours  simvastatin 10 milliGRAM(s) Oral at bedtime  sodium chloride 0.9%. 1000 milliLiter(s) IV Continuous <Continuous>                            9.6    20.0  )-----------( 140      ( 14 May 2018 06:34 )             30.3       Hemoglobin: 9.6 g/dL (05-14 @ 06:34)  Hemoglobin: 11.8 g/dL (05-12 @ 20:23)      05-14    138  |  108  |  18  ----------------------------<  86  4.2   |  25  |  1.53<H>    Ca    8.3<L>      14 May 2018 06:34  Phos  2.5     05-14  Mg     1.9     05-14    TPro  6.0  /  Alb  2.8<L>  /  TBili  0.5  /  DBili  x   /  AST  28  /  ALT  12  /  AlkPhos  62  05-14    Creatinine Trend: 1.53<--, 1.10<--    COAGS:           T(C): 37 (05-14-18 @ 08:13), Max: 37.2 (05-13-18 @ 15:53)  HR: 91 (05-14-18 @ 08:13) (71 - 91)  BP: 129/81 (05-14-18 @ 08:13) (97/45 - 129/81)  RR: 17 (05-14-18 @ 08:13) (16 - 20)  SpO2: 99% (05-14-18 @ 08:13) (97% - 99%)  Wt(kg): --    I&O's Summary    no JVD  RRR, no murmurs  CTAB  soft nt/nd  no c/c/e    TELEMETRY: none 	      DIAGNOSTIC TESTING:  [ ] Echocardiogram:   [ ]  Catheterization:  [ ] Stress Test:    OTHER: 	        ASSESSMENT/PLAN: 	89y Female  PMH dementia, HTN, hyperlipidemia and a prior TIA. She is now admitted with fever r/o sepsis. Cardiology is called because her EKG shows a first degree AV delay. The QRS is narrow. There is no history of syncope, nor prior cardiac workup on file.    Pt pan cultured yesterday, cont ABX broad spectrum till culture are resulted   GI / DVT prophylaxis.   keep K>4, mag >2.0  statin   supportive care.   further plans post culture  ,  D/W Dr No

## 2018-05-14 NOTE — CONSULT NOTE ADULT - PROBLEM SELECTOR RECOMMENDATION 9
Pt likely unable to give urine sample freely, and I would NOT catheterize for urine chemistry studies..   Check Bladder scan to r/o obstruction . CT showed some urine in bladder, but that was not post-void..

## 2018-05-14 NOTE — PROGRESS NOTE ADULT - PROBLEM SELECTOR PLAN 6
VTE IMPROVE VTE Individual Risk Assessment          RISK                                                          Points  [  ] Previous VTE                                                3  [  ] Thrombophilia                                             2  [  ] Lower limb paralysis                                   2        (unable to hold up >15 seconds)    [  ] Current Cancer                                             2         (within 6 months)  [z  ] Immobilization > 24 hrs                              1  [  ] ICU/CCU stay > 24 hours                             1  [ x ] Age > 60                                                         1    IMPROVE VTE Score:   VTE score 2 will give heparin for DVT prophylaxis pt takes Ezetimibe 10 mg daily   f/u Lipid profile : wnl

## 2018-05-14 NOTE — CONSULT NOTE ADULT - ASSESSMENT
89y F with MIN in the setting of sepsis/UTI with hypotension, also s/p IV contrast and possible contrast-induced nephropathy.  Would r/o obstruction.  Kidney cyst of no consequence given the benign appearance and age of patient.
She is a 89 yo female with dementia, HTN, hyperlipidemia and a prior TIA, brought in to the ER from home for evaluation of chills and fever.  On admission, she found to have fever, Leukocytosis, and mildly positive urine analysis. The RVP and CXR is Negative.  She has started on Zosyn and The ID consult requested to assist with further evaluation and antibiotic management.     # Sepsis  # UTI    Would recommend:  1. Follow up Urine and Blood cultures  2. Monitor WBC count  3. Monitor Temp and c/w supportive care  4. Continue Zosyn until cultures are finalized    d/w Family at the bed side and ER Nursing staff    will follow the patient with you and make further recommendation based on the clinical course and Lab results  Thank you for the opportunity to participate in Ms. SEPULVEDA's care

## 2018-05-14 NOTE — CONSULT NOTE ADULT - ATTENDING COMMENTS
[facility] NEPHROLOGY  Rony Nino M.D.  Lukas Lozada D.O.  Lorna Carney M.D.  Anahi Smith, MSN, ANP-C    Telephone: [phone]  Facsimile: [phone]    [address] [place] [address]

## 2018-05-14 NOTE — PATIENT PROFILE ADULT. - PRO INTERPRETER NEED 2
Nael Angela; PhD), Neurology  [address] [place] [id number]  Phone: [phone]  Fax: [phone] Mongolian

## 2018-05-14 NOTE — PATIENT PROFILE ADULT. - LANGUAGE ASSISTANCE NEEDED
No-Patient/Caregiver offered and refused free interpretation services./nurse speaks Indian, pt. confused

## 2018-05-14 NOTE — PROGRESS NOTE ADULT - ASSESSMENT
89 yo F p/w sob, malaise, fatigue. CXR (+) infiltrate. Vitals: febrile and labs (+) leukocytosis.   Admitted for:   -Sepsis 2/2 CAPNA, concerning for aspiration pna - improved vitals, persistent leukocytosis. C/w abx. f/u leg, adjust abx prn. C/w nebs, supportive care, oxygen supplementation, pt.   -MIN - f/u bladder scan, if significant retention then trejo cath. nephro on board.     -Essential HTN - holding home antihypertensive rx 2/2 low normal bp, adjust rx prn, monitor vitals   -DVT/GI PPx  -Hypercholesterolemia: c/w home rx

## 2018-05-14 NOTE — ADVANCED PRACTICE NURSE CONSULT - RECOMMEDATIONS
-Offload the patients L. Elbow using pillows  -Elevate/float the patients heels using heel protectors and reposition the patient Q 2hrs using wedges or pillows

## 2018-05-14 NOTE — PROGRESS NOTE ADULT - SUBJECTIVE AND OBJECTIVE BOX
Patient is seen and examined at the bed side, is afebrile. The Leukocytosis is trending up.        REVIEW OF SYSTEMS: All other review systems are negative        Vital Signs Last 24 Hrs  T(C): 36.5 (14 May 2018 16:10), Max: 37.2 (13 May 2018 23:45)  T(F): 97.7 (14 May 2018 16:10), Max: 99 (14 May 2018 03:02)  HR: 83 (14 May 2018 16:10) (71 - 95)  BP: 102/46 (14 May 2018 16:10) (102/46 - 136/66)  BP(mean): --  RR: 18 (14 May 2018 16:10) (17 - 20)  SpO2: 97% (14 May 2018 16:10) (96% - 99%)        ALLERGIES: NKDA      PHYSICAL EXAM:  GENERAL: Not in distress  CVS: s1  and s2 present  RESP: Air entry B/L  GI: Abdomen nondistended and nontender  EXT: No pedal edema  CNS: Awake, Alert and demented             LABS:                        9.6    20.0  )-----------( 140      ( 14 May 2018 06:34 )             30.3                           11.8   13.1  )-----------( 152      ( 12 May 2018 20:23 )             36.8         05-14    138  |  108  |  18  ----------------------------<  86  4.2   |  25  |  1.53<H>    Ca    8.3<L>      14 May 2018 06:34  Phos  2.5     05-14  Mg     1.9     05-14    TPro  6.0  /  Alb  2.8<L>  /  TBili  0.5  /  DBili  x   /  AST  28  /  ALT  12  /  AlkPhos  62  05-14    05-12    136  |  105  |  20<H>  ----------------------------<  102<H>  4.1   |  25  |  1.10    Ca    8.8      12 May 2018 20:23  Mg     1.7     05-12    TPro  6.8  /  Alb  3.3<L>  /  TBili  0.6  /  DBili  x   /  AST  22  /  ALT  10  /  AlkPhos  91  05-12    PT/INR - ( 12 May 2018 20:23 )   PT: 11.0 sec;   INR: 1.01 ratio         PTT - ( 12 May 2018 20:23 )  PTT:27.4 sec  Urinalysis Basic - ( 12 May 2018 21:09 )    Color: Yellow / Appearance: Clear / S.010 / pH: x  Gluc: x / Ketone: Negative  / Bili: Negative / Urobili: Negative   Blood: x / Protein: Negative / Nitrite: Positive   Leuk Esterase: Small / RBC: 2-5 /HPF / WBC 3-5 /HPF   Sq Epi: x / Non Sq Epi: Occasional /HPF / Bacteria: TNTC /HPF            MEDICATIONS  (STANDING):  ALBUTerol/ipratropium for Nebulization 3 milliLiter(s) Nebulizer every 6 hours  heparin  Injectable 5000 Unit(s) SubCutaneous every 12 hours  multivitamin/minerals 1 Tablet(s) Oral daily  pantoprazole    Tablet 40 milliGRAM(s) Oral before breakfast  piperacillin/tazobactam IVPB. 3.375 Gram(s) IV Intermittent every 8 hours  simvastatin 10 milliGRAM(s) Oral at bedtime  sodium chloride 0.9%. 1000 milliLiter(s) (50 mL/Hr) IV Continuous <Continuous>    MEDICATIONS  (PRN):  acetaminophen   Tablet 650 milliGRAM(s) Oral every 6 hours PRN For Temp greater than 38 C (100.4 F)  acetaminophen   Tablet. 650 milliGRAM(s) Oral every 6 hours PRN Mild Pain (1 - 3)            RADIOLOGY & ADDITIONAL TESTS:    18: CT Abdomen and Pelvis w/ Oral Cont and w/ IV Cont (18 @ 04:25) Possible gastritis. Clinical correlation is suggested.      18: Xray Chest 1 View AP/PA (18 @ 20:07) Study limited due to rotation. Overlying chin obscures right lung apex. No gross consolidation or pleural effusion. Chronic rib deformities. Chronic right clavicle deformity.          MICROBIOLOGY DATA:    Culture - Urine (18 @ 12:24)    Specimen Source: .Urine Catheterized    Culture Results:   >100,000 CFU/ml Escherichia coli    Rapid Respiratory Viral Panel (18 @ 23:42)    Rapid RVP Result: NotDetec: The FilmArray RVP Rapid uses polymerase chain reaction (PCR) and melt  curve analysis to screen for adenovirus; coronavirus HKU1, NL63, 229E,  OC43; human metapneumovirus (hMPV); human enterovirus/rhinovirus  (Entero/RV); influenza A; influenza A/H1;influenza A/H3; influenza  A/H1-2009; influenza B; parainfluenza viruses 1, 2, 3, 4; respiratory  syncytial virus; Bordetella pertussis; Mycoplasma pneumoniae; and  Chlamydophila pneumoniae.    Culture - Blood (18 @ 23:13)    Specimen Source: .Blood Blood-Peripheral    Culture Results:   No growth to date.    Culture - Blood (18 @ 23:13)    Specimen Source: .Blood Blood-Peripheral    Culture Results:   No growth to date.        Rapid Respiratory Viral Panel (18 @ 23:42)    Rapid RVP Result: NotDetec: The FilmArray RVP Rapid uses polymerase chain reaction (PCR) and melt  curve analysis to screen for adenovirus; coronavirus HKU1, NL63, 229E,  OC43; human metapneumovirus (hMPV); human enterovirus/rhinovirus  (Entero/RV); influenza A; influenza A/H1;influenza A/H3; influenza  A/H1-2009; influenza B; parainfluenza viruses 1, 2, 3, 4; respiratory  syncytial virus; Bordetella pertussis; Mycoplasma pneumoniae; and  Chlamydophila pneumoniae. Patient is seen and examined at the bed side, is afebrile. The Leukocytosis is trending up. The urine culture is growing E.coli.        REVIEW OF SYSTEMS: All other review systems are negative        Vital Signs Last 24 Hrs  T(C): 36.5 (14 May 2018 16:10), Max: 37.2 (13 May 2018 23:45)  T(F): 97.7 (14 May 2018 16:10), Max: 99 (14 May 2018 03:02)  HR: 83 (14 May 2018 16:10) (71 - 95)  BP: 102/46 (14 May 2018 16:10) (102/46 - 136/66)  BP(mean): --  RR: 18 (14 May 2018 16:10) (17 - 20)  SpO2: 97% (14 May 2018 16:10) (96% - 99%)            PHYSICAL EXAM:  GENERAL: Not in distress  CVS: s1  and s2 present  RESP: Air entry B/L  GI: Abdomen nondistended and nontender  EXT: No pedal edema  CNS: Awake, Alert and demented         ALLERGIES: NKDA        LABS:                        9.6    20.0  )-----------( 140      ( 14 May 2018 06:34 )             30.3                           11.8   13.1  )-----------( 152      ( 12 May 2018 20:23 )             36.8         05-14    138  |  108  |  18  ----------------------------<  86  4.2   |  25  |  1.53<H>    Ca    8.3<L>      14 May 2018 06:34  Phos  2.5     05-14  Mg     1.9     05-14    TPro  6.0  /  Alb  2.8<L>  /  TBili  0.5  /  DBili  x   /  AST  28  /  ALT  12  /  AlkPhos  62  05-14    05-12    136  |  105  |  20<H>  ----------------------------<  102<H>  4.1   |  25  |  1.10    Ca    8.8      12 May 2018 20:23  Mg     1.7     05-12    TPro  6.8  /  Alb  3.3<L>  /  TBili  0.6  /  DBili  x   /  AST  22  /  ALT  10  /  AlkPhos  91  05-12    PT/INR - ( 12 May 2018 20:23 )   PT: 11.0 sec;   INR: 1.01 ratio         PTT - ( 12 May 2018 20:23 )  PTT:27.4 sec  Urinalysis Basic - ( 12 May 2018 21:09 )    Color: Yellow / Appearance: Clear / S.010 / pH: x  Gluc: x / Ketone: Negative  / Bili: Negative / Urobili: Negative   Blood: x / Protein: Negative / Nitrite: Positive   Leuk Esterase: Small / RBC: 2-5 /HPF / WBC 3-5 /HPF   Sq Epi: x / Non Sq Epi: Occasional /HPF / Bacteria: TNTC /HPF            MEDICATIONS  (STANDING):  ALBUTerol/ipratropium for Nebulization 3 milliLiter(s) Nebulizer every 6 hours  heparin  Injectable 5000 Unit(s) SubCutaneous every 12 hours  multivitamin/minerals 1 Tablet(s) Oral daily  pantoprazole    Tablet 40 milliGRAM(s) Oral before breakfast  piperacillin/tazobactam IVPB. 3.375 Gram(s) IV Intermittent every 8 hours  simvastatin 10 milliGRAM(s) Oral at bedtime  sodium chloride 0.9%. 1000 milliLiter(s) (50 mL/Hr) IV Continuous <Continuous>    MEDICATIONS  (PRN):  acetaminophen   Tablet 650 milliGRAM(s) Oral every 6 hours PRN For Temp greater than 38 C (100.4 F)  acetaminophen   Tablet. 650 milliGRAM(s) Oral every 6 hours PRN Mild Pain (1 - 3)            RADIOLOGY & ADDITIONAL TESTS:    18: CT Abdomen and Pelvis w/ Oral Cont and w/ IV Cont (18 @ 04:25) Possible gastritis. Clinical correlation is suggested.      18: Xray Chest 1 View AP/PA (18 @ 20:07) Study limited due to rotation. Overlying chin obscures right lung apex. No gross consolidation or pleural effusion. Chronic rib deformities. Chronic right clavicle deformity.          MICROBIOLOGY DATA:      Culture - Urine (18 @ 12:24)    Specimen Source: .Urine Catheterized    Culture Results:   >100,000 CFU/ml Escherichia coli    Rapid Respiratory Viral Panel (18 @ 23:42)    Rapid RVP Result: NotDetec: The FilmArray RVP Rapid uses polymerase chain reaction (PCR) and melt  curve analysis to screen for adenovirus; coronavirus HKU1, NL63, 229E,  OC43; human metapneumovirus (hMPV); human enterovirus/rhinovirus  (Entero/RV); influenza A; influenza A/H1;influenza A/H3; influenza  A/H1-2009; influenza B; parainfluenza viruses 1, 2, 3, 4; respiratory  syncytial virus; Bordetella pertussis; Mycoplasma pneumoniae; and  Chlamydophila pneumoniae.    Culture - Blood (18 @ 23:13)    Specimen Source: .Blood Blood-Peripheral    Culture Results:   No growth to date.    Culture - Blood (18 @ 23:13)    Specimen Source: .Blood Blood-Peripheral    Culture Results:   No growth to date.        Rapid Respiratory Viral Panel (18 @ 23:42)    Rapid RVP Result: NotDetec: The FilmArray RVP Rapid uses polymerase chain reaction (PCR) and melt  curve analysis to screen for adenovirus; coronavirus HKU1, NL63, 229E,  OC43; human metapneumovirus (hMPV); human enterovirus/rhinovirus  (Entero/RV); influenza A; influenza A/H1;influenza A/H3; influenza  A/H1-2009; influenza B; parainfluenza viruses 1, 2, 3, 4; respiratory  syncytial virus; Bordetella pertussis; Mycoplasma pneumoniae; and  Chlamydophila pneumoniae.

## 2018-05-14 NOTE — PROGRESS NOTE ADULT - PROBLEM SELECTOR PLAN 7
a Stage 1 Pressure Injury to the L. Elbow and Bilateral Heels; presenting as non-blanchable erythema  Recs : -Offload the patients L. Elbow using pillows  -Elevate/float the patients heels using heel protectors and reposition the patient Q 2hrs using wedges or pillows VTE IMPROVE VTE Individual Risk Assessment          RISK                                                          Points  [  ] Previous VTE                                                3  [  ] Thrombophilia                                             2  [  ] Lower limb paralysis                                   2        (unable to hold up >15 seconds)    [  ] Current Cancer                                             2         (within 6 months)  [z  ] Immobilization > 24 hrs                              1  [  ] ICU/CCU stay > 24 hours                             1  [ x ] Age > 60                                                         1    IMPROVE VTE Score:   VTE score 2 will give heparin for DVT prophylaxis

## 2018-05-14 NOTE — PROGRESS NOTE ADULT - SUBJECTIVE AND OBJECTIVE BOX
PGY 1 Note discussed with supervising resident and primary attending    Patient is a 89y old  Female who presents with a chief complaint of chills and fever (13 May 2018 00:10)      INTERVAL HPI/OVERNIGHT EVENTS: offers no new complaints; current symptoms resolving    MEDICATIONS  (STANDING):  ALBUTerol/ipratropium for Nebulization 3 milliLiter(s) Nebulizer every 6 hours  azithromycin  IVPB      azithromycin  IVPB 500 milliGRAM(s) IV Intermittent every 24 hours  heparin  Injectable 5000 Unit(s) SubCutaneous every 12 hours  multivitamin/minerals 1 Tablet(s) Oral daily  pantoprazole    Tablet 40 milliGRAM(s) Oral before breakfast  piperacillin/tazobactam IVPB. 3.375 Gram(s) IV Intermittent every 8 hours  simvastatin 10 milliGRAM(s) Oral at bedtime  sodium chloride 0.9%. 1000 milliLiter(s) (50 mL/Hr) IV Continuous <Continuous>    MEDICATIONS  (PRN):  acetaminophen   Tablet 650 milliGRAM(s) Oral every 6 hours PRN For Temp greater than 38 C (100.4 F)  acetaminophen   Tablet. 650 milliGRAM(s) Oral every 6 hours PRN Mild Pain (1 - 3)      __________________________________________________  REVIEW OF SYSTEMS:    CONSTITUTIONAL: No fever,   EYES: no acute visual disturbances  NECK: No pain or stiffness  RESPIRATORY: No cough; No shortness of breath  CARDIOVASCULAR: No chest pain, no palpitations  GASTROINTESTINAL: No pain. No nausea or vomiting; No diarrhea   NEUROLOGICAL: No headache or numbness, no tremors  MUSCULOSKELETAL: No joint pain, no muscle pain  GENITOURINARY: no dysuria, no frequency, no hesitancy  PSYCHIATRY: no depression , no anxiety  ALL OTHER  ROS negative        Vital Signs Last 24 Hrs  T(C): 36.3 (14 May 2018 09:04), Max: 37.2 (13 May 2018 15:53)  T(F): 97.4 (14 May 2018 09:04), Max: 99 (13 May 2018 15:53)  HR: 95 (14 May 2018 09:04) (71 - 95)  BP: 136/66 (14 May 2018 09:04) (97/45 - 136/66)  BP(mean): --  RR: 18 (14 May 2018 09:04) (16 - 20)  SpO2: 96% (14 May 2018 09:04) (96% - 99%)    ________________________________________________  PHYSICAL EXAM:  GENERAL: NAD  HEENT: Normocephalic;  conjunctivae and sclerae clear; moist mucous membranes;   NECK : supple  CHEST/LUNG: Reduced breath sounds in Right lower lobe    HEART: S1 S2  regular; no murmurs, gallops or rubs  ABDOMEN: Soft, Nontender, Nondistended; Bowel sounds present  EXTREMITIES: no cyanosis; no edema; no calf tenderness  SKIN: warm and dry; no rash  NERVOUS SYSTEM:  AAO*1     _________________________________________________  LABS:                        9.6    20.0  )-----------( 140      ( 14 May 2018 06:34 )             30.3     05-14    138  |  108  |  18  ----------------------------<  86  4.2   |  25  |  1.53<H>    Ca    8.3<L>      14 May 2018 06:34  Phos  2.5     05-14  Mg     1.9     05-14    TPro  6.0  /  Alb  2.8<L>  /  TBili  0.5  /  DBili  x   /  AST  28  /  ALT  12  /  AlkPhos  62  05-14    PT/INR - ( 12 May 2018 20:23 )   PT: 11.0 sec;   INR: 1.01 ratio         PTT - ( 12 May 2018 20:23 )  PTT:27.4 sec  Urinalysis Basic - ( 12 May 2018 21:09 )    Color: Yellow / Appearance: Clear / S.010 / pH: x  Gluc: x / Ketone: Negative  / Bili: Negative / Urobili: Negative   Blood: x / Protein: Negative / Nitrite: Positive   Leuk Esterase: Small / RBC: 2-5 /HPF / WBC 3-5 /HPF   Sq Epi: x / Non Sq Epi: Occasional /HPF / Bacteria: TNTC /HPF      CAPILLARY BLOOD GLUCOSE      RADIOLOGY & ADDITIONAL TESTS:    < from: CT Abdomen and Pelvis w/ Oral Cont and w/ IV Cont (18 @ 04:25) >    IMPRESSION: Possible gastritis. Clinical correlation is suggested..    The above study was reviewed by the offsite overnight image reading   service at the time of the examination.    Thank you for  this referral.    < end of copied text >  < from: CT Abdomen and Pelvis w/ Oral Cont and w/ IV Cont (18 @ 04:25) >  Solid organs: 4.5 cm cyst of the lower pole of the right kidney. Small   intraparenchymal cyst is seen in the left kidney. The pancreas is   atrophic.      < end of copied text >      Imaging Personally Reviewed:  YES    Consultant(s) Notes Reviewed:   YES    Care Discussed with Consultants : YES    Plan of care was discussed with patient and /or primary care giver; all questions and concerns were addressed and care was aligned with patient's wishes. PGY 1 Note discussed with supervising resident and primary attending    Patient is a 89y old  Female who presents with a chief complaint of chills and fever (13 May 2018 00:10)      INTERVAL HPI/OVERNIGHT EVENTS: offers no new complaints; current symptoms resolving    MEDICATIONS  (STANDING):  ALBUTerol/ipratropium for Nebulization 3 milliLiter(s) Nebulizer every 6 hours  azithromycin  IVPB      azithromycin  IVPB 500 milliGRAM(s) IV Intermittent every 24 hours  heparin  Injectable 5000 Unit(s) SubCutaneous every 12 hours  multivitamin/minerals 1 Tablet(s) Oral daily  pantoprazole    Tablet 40 milliGRAM(s) Oral before breakfast  piperacillin/tazobactam IVPB. 3.375 Gram(s) IV Intermittent every 8 hours  simvastatin 10 milliGRAM(s) Oral at bedtime  sodium chloride 0.9%. 1000 milliLiter(s) (50 mL/Hr) IV Continuous <Continuous>    MEDICATIONS  (PRN):  acetaminophen   Tablet 650 milliGRAM(s) Oral every 6 hours PRN For Temp greater than 38 C (100.4 F)  acetaminophen   Tablet. 650 milliGRAM(s) Oral every 6 hours PRN Mild Pain (1 - 3)      __________________________________________________  REVIEW OF SYSTEMS:    CONSTITUTIONAL: No fever,   EYES: no acute visual disturbances  NECK: No pain or stiffness  RESPIRATORY: No cough; No shortness of breath  CARDIOVASCULAR: No chest pain, no palpitations  GASTROINTESTINAL: No pain. No nausea or vomiting; No diarrhea   NEUROLOGICAL: No headache or numbness, no tremors  MUSCULOSKELETAL: No joint pain, no muscle pain  GENITOURINARY: no dysuria, no frequency, no hesitancy  PSYCHIATRY: no depression , no anxiety  ALL OTHER  ROS negative        Vital Signs Last 24 Hrs  T(C): 36.3 (14 May 2018 09:04), Max: 37.2 (13 May 2018 15:53)  T(F): 97.4 (14 May 2018 09:04), Max: 99 (13 May 2018 15:53)  HR: 95 (14 May 2018 09:04) (71 - 95)  BP: 136/66 (14 May 2018 09:04) (97/45 - 136/66)  BP(mean): --  RR: 18 (14 May 2018 09:04) (16 - 20)  SpO2: 96% (14 May 2018 09:04) (96% - 99%)    ________________________________________________  PHYSICAL EXAM:  GENERAL: NAD  HEENT: Normocephalic;  conjunctivae and sclerae clear; moist mucous membranes;   NECK : supple  CHEST/LUNG: Reduced breath sounds in Right lower lobe    HEART: S1 S2  regular; no murmurs, gallops or rubs  ABDOMEN: Soft, Nontender, Nondistended; Bowel sounds present  EXTREMITIES: no cyanosis; no edema; no calf tenderness  SKIN: a Stage 1 Pressure Injury to the L. Elbow and Bilateral Heels; presenting as non-blanchable erythema  NERVOUS SYSTEM:  AAO*1     _________________________________________________  LABS:                        9.6    20.0  )-----------( 140      ( 14 May 2018 06:34 )             30.3     05-14    138  |  108  |  18  ----------------------------<  86  4.2   |  25  |  1.53<H>    Ca    8.3<L>      14 May 2018 06:34  Phos  2.5     05-14  Mg     1.9     -14    TPro  6.0  /  Alb  2.8<L>  /  TBili  0.5  /  DBili  x   /  AST  28  /  ALT  12  /  AlkPhos  62  05-14    PT/INR - ( 12 May 2018 20:23 )   PT: 11.0 sec;   INR: 1.01 ratio         PTT - ( 12 May 2018 20:23 )  PTT:27.4 sec  Urinalysis Basic - ( 12 May 2018 21:09 )    Color: Yellow / Appearance: Clear / S.010 / pH: x  Gluc: x / Ketone: Negative  / Bili: Negative / Urobili: Negative   Blood: x / Protein: Negative / Nitrite: Positive   Leuk Esterase: Small / RBC: 2-5 /HPF / WBC 3-5 /HPF   Sq Epi: x / Non Sq Epi: Occasional /HPF / Bacteria: TNTC /HPF      CAPILLARY BLOOD GLUCOSE      RADIOLOGY & ADDITIONAL TESTS:    < from: CT Abdomen and Pelvis w/ Oral Cont and w/ IV Cont (18 @ 04:25) >    IMPRESSION: Possible gastritis. Clinical correlation is suggested..    The above study was reviewed by the offsite overnight image reading   service at the time of the examination.    Thank you for  this referral.    < end of copied text >  < from: CT Abdomen and Pelvis w/ Oral Cont and w/ IV Cont (18 @ 04:25) >  Solid organs: 4.5 cm cyst of the lower pole of the right kidney. Small   intraparenchymal cyst is seen in the left kidney. The pancreas is   atrophic.      < end of copied text >      Imaging Personally Reviewed:  YES    Consultant(s) Notes Reviewed:   YES    Care Discussed with Consultants : YES    Plan of care was discussed with patient and /or primary care giver; all questions and concerns were addressed and care was aligned with patient's wishes.

## 2018-05-15 DIAGNOSIS — N13.9 OBSTRUCTIVE AND REFLUX UROPATHY, UNSPECIFIED: ICD-10-CM

## 2018-05-15 LAB
-  AMIKACIN: SIGNIFICANT CHANGE UP
-  AMOXICILLIN/CLAVULANIC ACID: SIGNIFICANT CHANGE UP
-  AMPICILLIN/SULBACTAM: SIGNIFICANT CHANGE UP
-  AMPICILLIN: SIGNIFICANT CHANGE UP
-  AZTREONAM: SIGNIFICANT CHANGE UP
-  CEFAZOLIN: SIGNIFICANT CHANGE UP
-  CEFEPIME: SIGNIFICANT CHANGE UP
-  CEFOXITIN: SIGNIFICANT CHANGE UP
-  CEFTRIAXONE: SIGNIFICANT CHANGE UP
-  CIPROFLOXACIN: SIGNIFICANT CHANGE UP
-  ERTAPENEM: SIGNIFICANT CHANGE UP
-  GENTAMICIN: SIGNIFICANT CHANGE UP
-  IMIPENEM: SIGNIFICANT CHANGE UP
-  LEVOFLOXACIN: SIGNIFICANT CHANGE UP
-  MEROPENEM: SIGNIFICANT CHANGE UP
-  NITROFURANTOIN: SIGNIFICANT CHANGE UP
-  PIPERACILLIN/TAZOBACTAM: SIGNIFICANT CHANGE UP
-  TIGECYCLINE: SIGNIFICANT CHANGE UP
-  TOBRAMYCIN: SIGNIFICANT CHANGE UP
-  TRIMETHOPRIM/SULFAMETHOXAZOLE: SIGNIFICANT CHANGE UP
ANION GAP SERPL CALC-SCNC: 7 MMOL/L — SIGNIFICANT CHANGE UP (ref 5–17)
BASOPHILS # BLD AUTO: 0.1 K/UL — SIGNIFICANT CHANGE UP (ref 0–0.2)
BASOPHILS NFR BLD AUTO: 1.1 % — SIGNIFICANT CHANGE UP (ref 0–2)
BUN SERPL-MCNC: 14 MG/DL — SIGNIFICANT CHANGE UP (ref 7–18)
CALCIUM SERPL-MCNC: 8.1 MG/DL — LOW (ref 8.4–10.5)
CHLORIDE SERPL-SCNC: 111 MMOL/L — HIGH (ref 96–108)
CO2 SERPL-SCNC: 22 MMOL/L — SIGNIFICANT CHANGE UP (ref 22–31)
CREAT SERPL-MCNC: 1.44 MG/DL — HIGH (ref 0.5–1.3)
CULTURE RESULTS: SIGNIFICANT CHANGE UP
EOSINOPHIL # BLD AUTO: 0.3 K/UL — SIGNIFICANT CHANGE UP (ref 0–0.5)
EOSINOPHIL NFR BLD AUTO: 2.9 % — SIGNIFICANT CHANGE UP (ref 0–6)
GLUCOSE SERPL-MCNC: 86 MG/DL — SIGNIFICANT CHANGE UP (ref 70–99)
HCT VFR BLD CALC: 29.4 % — LOW (ref 34.5–45)
HGB BLD-MCNC: 9.4 G/DL — LOW (ref 11.5–15.5)
LYMPHOCYTES # BLD AUTO: 1.7 K/UL — SIGNIFICANT CHANGE UP (ref 1–3.3)
LYMPHOCYTES # BLD AUTO: 16.8 % — SIGNIFICANT CHANGE UP (ref 13–44)
MAGNESIUM SERPL-MCNC: 2.1 MG/DL — SIGNIFICANT CHANGE UP (ref 1.6–2.6)
MCHC RBC-ENTMCNC: 28.7 PG — SIGNIFICANT CHANGE UP (ref 27–34)
MCHC RBC-ENTMCNC: 31.9 GM/DL — LOW (ref 32–36)
MCV RBC AUTO: 90.2 FL — SIGNIFICANT CHANGE UP (ref 80–100)
METHOD TYPE: SIGNIFICANT CHANGE UP
MONOCYTES # BLD AUTO: 0.6 K/UL — SIGNIFICANT CHANGE UP (ref 0–0.9)
MONOCYTES NFR BLD AUTO: 5.6 % — SIGNIFICANT CHANGE UP (ref 2–14)
NEUTROPHILS # BLD AUTO: 7.3 K/UL — SIGNIFICANT CHANGE UP (ref 1.8–7.4)
NEUTROPHILS NFR BLD AUTO: 73.6 % — SIGNIFICANT CHANGE UP (ref 43–77)
ORGANISM # SPEC MICROSCOPIC CNT: SIGNIFICANT CHANGE UP
ORGANISM # SPEC MICROSCOPIC CNT: SIGNIFICANT CHANGE UP
PHOSPHATE SERPL-MCNC: 2.9 MG/DL — SIGNIFICANT CHANGE UP (ref 2.5–4.5)
PLATELET # BLD AUTO: 148 K/UL — LOW (ref 150–400)
POTASSIUM SERPL-MCNC: 4 MMOL/L — SIGNIFICANT CHANGE UP (ref 3.5–5.3)
POTASSIUM SERPL-SCNC: 4 MMOL/L — SIGNIFICANT CHANGE UP (ref 3.5–5.3)
RBC # BLD: 3.26 M/UL — LOW (ref 3.8–5.2)
RBC # FLD: 13.9 % — SIGNIFICANT CHANGE UP (ref 10.3–14.5)
SODIUM SERPL-SCNC: 140 MMOL/L — SIGNIFICANT CHANGE UP (ref 135–145)
SPECIMEN SOURCE: SIGNIFICANT CHANGE UP
WBC # BLD: 9.9 K/UL — SIGNIFICANT CHANGE UP (ref 3.8–10.5)
WBC # FLD AUTO: 9.9 K/UL — SIGNIFICANT CHANGE UP (ref 3.8–10.5)

## 2018-05-15 RX ORDER — SENNA PLUS 8.6 MG/1
2 TABLET ORAL AT BEDTIME
Qty: 0 | Refills: 0 | Status: DISCONTINUED | OUTPATIENT
Start: 2018-05-15 | End: 2018-05-17

## 2018-05-15 RX ORDER — POLYETHYLENE GLYCOL 3350 17 G/17G
17 POWDER, FOR SOLUTION ORAL DAILY
Qty: 0 | Refills: 0 | Status: DISCONTINUED | OUTPATIENT
Start: 2018-05-15 | End: 2018-05-17

## 2018-05-15 RX ORDER — DOCUSATE SODIUM 100 MG
100 CAPSULE ORAL
Qty: 0 | Refills: 0 | Status: DISCONTINUED | OUTPATIENT
Start: 2018-05-15 | End: 2018-05-17

## 2018-05-15 RX ADMIN — Medication 3 MILLILITER(S): at 21:25

## 2018-05-15 RX ADMIN — SIMVASTATIN 10 MILLIGRAM(S): 20 TABLET, FILM COATED ORAL at 21:36

## 2018-05-15 RX ADMIN — PIPERACILLIN AND TAZOBACTAM 25 GRAM(S): 4; .5 INJECTION, POWDER, LYOPHILIZED, FOR SOLUTION INTRAVENOUS at 06:22

## 2018-05-15 RX ADMIN — PANTOPRAZOLE SODIUM 40 MILLIGRAM(S): 20 TABLET, DELAYED RELEASE ORAL at 06:22

## 2018-05-15 RX ADMIN — SENNA PLUS 2 TABLET(S): 8.6 TABLET ORAL at 21:36

## 2018-05-15 RX ADMIN — PIPERACILLIN AND TAZOBACTAM 25 GRAM(S): 4; .5 INJECTION, POWDER, LYOPHILIZED, FOR SOLUTION INTRAVENOUS at 21:36

## 2018-05-15 RX ADMIN — Medication 100 MILLIGRAM(S): at 17:36

## 2018-05-15 RX ADMIN — HEPARIN SODIUM 5000 UNIT(S): 5000 INJECTION INTRAVENOUS; SUBCUTANEOUS at 17:35

## 2018-05-15 RX ADMIN — HEPARIN SODIUM 5000 UNIT(S): 5000 INJECTION INTRAVENOUS; SUBCUTANEOUS at 06:23

## 2018-05-15 RX ADMIN — Medication 3 MILLILITER(S): at 14:55

## 2018-05-15 RX ADMIN — PIPERACILLIN AND TAZOBACTAM 25 GRAM(S): 4; .5 INJECTION, POWDER, LYOPHILIZED, FOR SOLUTION INTRAVENOUS at 13:34

## 2018-05-15 RX ADMIN — Medication 3 MILLILITER(S): at 10:10

## 2018-05-15 RX ADMIN — SODIUM CHLORIDE 50 MILLILITER(S): 9 INJECTION INTRAMUSCULAR; INTRAVENOUS; SUBCUTANEOUS at 06:22

## 2018-05-15 NOTE — PROGRESS NOTE ADULT - ASSESSMENT
89 year old female from lives with her daughter, needs 24 hr Assistance with  ADL's, HHA 12 hrs. Past medical H/o of dementia, HTN, TIA, HLD brought to ED  with C/o chills and fever since 1 day. Patient is minimall verbal, [place], History was given by her daughter. Patient was in her usual health, but this evening she was having chills, and tempt of 102 F at home along with fatigue and weaknes, sob, c.p, one episode of Vomiting and complains of left lower quadrant pain, she has bowel movement yesterday, no diarrhea or constipation.   Denies any headaches, neck pain or rigidity, SOB, cough, chest pain, dysuria or leg pains. Patient denies any sick contacts, flu like symptoms or recent travel.   In ED patient was febrile Tmax 102.6, vitals HR 92, /77, RR 16 and O2 sat 94 at room air, Leucocytosis, EKG NSR no ST t wave changes.   Patient is admitted to medicine floor for Sepsis , likely from Pneumonia.

## 2018-05-15 NOTE — PROGRESS NOTE ADULT - SUBJECTIVE AND OBJECTIVE BOX
[facility] NEPHROLOGY- CONSULTATION NOTE    Patient is a 89y F with MIN in the setting of sepsis/UTI with hypotension, also s/p IV contrast and possible contrast-induced nephropathy.  Pt also with urinary retention and is s/p trejo placement.  Pt doing a little better today.     REVIEW OF SYSTEMS: unable to obtain    VITALS:  T(F): 98.6 (05-15-18 @ 07:10), Max: 98.6 (05-15-18 @ 07:10)  HR: 89 (05-15-18 @ 07:10)  BP: 128/69 (05-15-18 @ 07:10)  RR: 16 (05-15-18 @ 07:10)  SpO2: 94% (05-15-18 @ 07:10)  Wt(kg): --     @ 07:01  -  05-15 @ 07:00  --------------------------------------------------------  IN: 0 mL / OUT: 1300 mL / NET: -1300 mL    05-15 @ 07:01  -  05-15 @ 15:42  --------------------------------------------------------  IN: 0 mL / OUT: 550 mL / NET: -550 mL        PHYSICAL EXAM:  Constitutional: NAD  HEENT: anicteric sclera, oropharynx clear, MMM  Neck: No JVD  Respiratory: CTAB, no wheezes, rales or rhonchi  Cardiovascular: S1, S2, RRR  Gastrointestinal: BS+, soft, NT/ND, ? Lower abd constipation vs. distended bladder?  Extremities: No cyanosis or clubbing. No peripheral edema  Neurological: Awake, minimally alert.  : No CVA tenderness. +trejo with yellow urine.  Skin: No rashes      LABS:  05-15    140  |  111<H>  |  14  ----------------------------<  86  4.0   |  22  |  1.44<H>    Ca    8.1<L>      15 May 2018 07:20  Phos  2.9     05-15  Mg     2.1     -15    TPro  6.0  /  Alb  2.8<L>  /  TBili  0.5  /  DBili      /  AST  28  /  ALT  12  /  AlkPhos  62  05-    Creatinine Trend: 1.44 <--, 1.53 <--, 1.10 <--                        9.4    9.9   )-----------( 148      ( 15 May 2018 07:20 )             29.4     Urine Studies:  Urinalysis Basic - ( 12 May 2018 21:09 )    Color: Yellow / Appearance: Clear / S.010 / pH:   Gluc:  / Ketone: Negative  / Bili: Negative / Urobili: Negative   Blood:  / Protein: Negative / Nitrite: Positive   Leuk Esterase: Small / RBC: 2-5 /HPF / WBC 3-5 /HPF   Sq Epi:  / Non Sq Epi: Occasional /HPF / Bacteria: TNTC /HPF        Bladder scan   400ml urine.

## 2018-05-15 NOTE — PROGRESS NOTE ADULT - SUBJECTIVE AND OBJECTIVE BOX
Patient is seen and examined at the bed side, is afebrile. The Leukocytosis is trending up. The urine culture is growing E.coli.        REVIEW OF SYSTEMS: All other review systems are negative      Vital Signs Last 24 Hrs  T(C): 36.5 (15 May 2018 16:15), Max: 37 (15 May 2018 07:10)  T(F): 97.7 (15 May 2018 16:15), Max: 98.6 (15 May 2018 07:10)  HR: 108 (15 May 2018 16:15) (88 - 108)  BP: 110/57 (15 May 2018 16:15) (107/45 - 128/69)  BP(mean): --  RR: 16 (15 May 2018 16:15) (16 - 16)  SpO2: 95% (15 May 2018 16:15) (91% - 95%)      PHYSICAL EXAM:  GENERAL: Not in distress  CVS: s1  and s2 present  RESP: Air entry B/L  GI: Abdomen nondistended and nontender  EXT: No pedal edema  CNS: Awake, Alert and demented         ALLERGIES: NKDA          LABS:                          9.4    9.9   )-----------( 148      ( 15 May 2018 07:20 )             29.4                           9.6    20.0  )-----------( 140      ( 14 May 2018 06:34 )             30.3                           11.8   13.1  )-----------( 152      ( 12 May 2018 20:23 )             36.8         05-15    140  |  111<H>  |  14  ----------------------------<  86  4.0   |  22  |  1.44<H>    Ca    8.1<L>      15 May 2018 07:20  Phos  2.9     05-15  Mg     2.1     05-15    TPro  6.0  /  Alb  2.8<L>  /  TBili  0.5  /  DBili  x   /  AST  28  /  ALT  12  /  AlkPhos  62  14    14    138  |  108  |  18  ----------------------------<  86  4.2   |  25  |  1.53<H>    Ca    8.3<L>      14 May 2018 06:34  Phos  2.5     -  Mg     1.9     -    TPro  6.0  /  Alb  2.8<L>  /  TBili  0.5  /  DBili  x   /  AST  28  /  ALT  12  /  AlkPhos  62  -         PTT - ( 12 May 2018 20:23 )  PTT:27.4 sec  Urinalysis Basic - ( 12 May 2018 21:09 )    Color: Yellow / Appearance: Clear / S.010 / pH: x  Gluc: x / Ketone: Negative  / Bili: Negative / Urobili: Negative   Blood: x / Protein: Negative / Nitrite: Positive   Leuk Esterase: Small / RBC: 2-5 /HPF / WBC 3-5 /HPF   Sq Epi: x / Non Sq Epi: Occasional /HPF / Bacteria: TNTC /HPF        MEDICATIONS  (STANDING):  ALBUTerol/ipratropium for Nebulization 3 milliLiter(s) Nebulizer every 6 hours  docusate sodium 100 milliGRAM(s) Oral two times a day  heparin  Injectable 5000 Unit(s) SubCutaneous every 12 hours  multivitamin/minerals 1 Tablet(s) Oral daily  pantoprazole    Tablet 40 milliGRAM(s) Oral before breakfast  piperacillin/tazobactam IVPB. 3.375 Gram(s) IV Intermittent every 8 hours  polyethylene glycol 3350 17 Gram(s) Oral daily  senna 2 Tablet(s) Oral at bedtime  simvastatin 10 milliGRAM(s) Oral at bedtime  sodium chloride 0.9%. 1000 milliLiter(s) (50 mL/Hr) IV Continuous <Continuous>    MEDICATIONS  (PRN):  acetaminophen   Tablet 650 milliGRAM(s) Oral every 6 hours PRN For Temp greater than 38 C (100.4 F)  acetaminophen   Tablet. 650 milliGRAM(s) Oral every 6 hours PRN Mild Pain (1 - 3)            RADIOLOGY & ADDITIONAL TESTS:    18: CT Abdomen and Pelvis w/ Oral Cont and w/ IV Cont (18 @ 04:25) Possible gastritis. Clinical correlation is suggested.      18: Xray Chest 1 View AP/PA (18 @ 20:07) Study limited due to rotation. Overlying chin obscures right lung apex. No gross consolidation or pleural effusion. Chronic rib deformities. Chronic right clavicle deformity.          MICROBIOLOGY DATA:      Culture - Urine (18 @ 12:24)    Specimen Source: .Urine Catheterized    Culture Results:   >100,000 CFU/ml Escherichia coli    Rapid Respiratory Viral Panel (18 @ 23:42)    Rapid RVP Result: NotDetec: The FilmArray RVP Rapid uses polymerase chain reaction (PCR) and melt  curve analysis to screen for adenovirus; coronavirus HKU1, NL63, 229E,  OC43; human metapneumovirus (hMPV); human enterovirus/rhinovirus  (Entero/RV); influenza A; influenza A/H1;influenza A/H3; influenza  A/H1-2009; influenza B; parainfluenza viruses 1, 2, 3, 4; respiratory  syncytial virus; Bordetella pertussis; Mycoplasma pneumoniae; and  Chlamydophila pneumoniae.    Culture - Blood (18 @ 23:13)    Specimen Source: .Blood Blood-Peripheral    Culture Results:   No growth to date.    Culture - Blood (18 @ 23:13)    Specimen Source: .Blood Blood-Peripheral    Culture Results:   No growth to date.        Rapid Respiratory Viral Panel (18 @ 23:42)    Rapid RVP Result: NotDetec: The FilmArray RVP Rapid uses polymerase chain reaction (PCR) and melt  curve analysis to screen for adenovirus; coronavirus HKU1, NL63, 229E,  OC43; human metapneumovirus (hMPV); human enterovirus/rhinovirus  (Entero/RV); influenza A; influenza A/H1;influenza A/H3; influenza  A/H1-2009; influenza B; parainfluenza viruses 1, 2, 3, 4; respiratory  syncytial virus; Bordetella pertussis; Mycoplasma pneumoniae; and  Chlamydophila pneumoniae. Patient is seen and examined at the bed side, is afebrile. She is doing better, more alert.  The Leukocytosis trended down to normal.       REVIEW OF SYSTEMS: All other review systems are negative      Vital Signs Last 24 Hrs  T(C): 36.5 (15 May 2018 16:15), Max: 37 (15 May 2018 07:10)  T(F): 97.7 (15 May 2018 16:15), Max: 98.6 (15 May 2018 07:10)  HR: 108 (15 May 2018 16:15) (88 - 108)  BP: 110/57 (15 May 2018 16:15) (107/45 - 128/69)  BP(mean): --  RR: 16 (15 May 2018 16:15) (16 - 16)  SpO2: 95% (15 May 2018 16:15) (91% - 95%)          PHYSICAL EXAM:  GENERAL: Not in distress  CVS: s1  and s2 present  RESP: Air entry B/L  GI: Abdomen nondistended and nontender  EXT: No pedal edema  CNS: Awake, Alert and demented         ALLERGIES: NKDA          LABS:                          9.4    9.9   )-----------( 148      ( 15 May 2018 07:20 )             29.4                           9.6    20.0  )-----------( 140      ( 14 May 2018 06:34 )             30.3                           11.8   13.1  )-----------( 152      ( 12 May 2018 20:23 )             36.8         05-15    140  |  111<H>  |  14  ----------------------------<  86  4.0   |  22  |  1.44<H>    Ca    8.1<L>      15 May 2018 07:20  Phos  2.9     05-15  Mg     2.1     05-15    TPro  6.0  /  Alb  2.8<L>  /  TBili  0.5  /  DBili  x   /  AST  28  /  ALT  12  /  AlkPhos  62  14    138  |  108  |  18  ----------------------------<  86  4.2   |  25  |  1.53<H>    Ca    8.3<L>      14 May 2018 06:34  Phos  2.5     -  Mg     1.9     -    TPro  6.0  /  Alb  2.8<L>  /  TBili  0.5  /  DBili  x   /  AST  28  /  ALT  12  /  AlkPhos  62  -         PTT - ( 12 May 2018 20:23 )  PTT:27.4 sec  Urinalysis Basic - ( 12 May 2018 21:09 )    Color: Yellow / Appearance: Clear / S.010 / pH: x  Gluc: x / Ketone: Negative  / Bili: Negative / Urobili: Negative   Blood: x / Protein: Negative / Nitrite: Positive   Leuk Esterase: Small / RBC: 2-5 /HPF / WBC 3-5 /HPF   Sq Epi: x / Non Sq Epi: Occasional /HPF / Bacteria: TNTC /HPF        MEDICATIONS  (STANDING):  ALBUTerol/ipratropium for Nebulization 3 milliLiter(s) Nebulizer every 6 hours  docusate sodium 100 milliGRAM(s) Oral two times a day  heparin  Injectable 5000 Unit(s) SubCutaneous every 12 hours  multivitamin/minerals 1 Tablet(s) Oral daily  pantoprazole    Tablet 40 milliGRAM(s) Oral before breakfast  piperacillin/tazobactam IVPB. 3.375 Gram(s) IV Intermittent every 8 hours  polyethylene glycol 3350 17 Gram(s) Oral daily  senna 2 Tablet(s) Oral at bedtime  simvastatin 10 milliGRAM(s) Oral at bedtime  sodium chloride 0.9%. 1000 milliLiter(s) (50 mL/Hr) IV Continuous <Continuous>    MEDICATIONS  (PRN):  acetaminophen   Tablet 650 milliGRAM(s) Oral every 6 hours PRN For Temp greater than 38 C (100.4 F)  acetaminophen   Tablet. 650 milliGRAM(s) Oral every 6 hours PRN Mild Pain (1 - 3)            RADIOLOGY & ADDITIONAL TESTS:    18: CT Abdomen and Pelvis w/ Oral Cont and w/ IV Cont (18 @ 04:25) Possible gastritis. Clinical correlation is suggested.      18: Xray Chest 1 View AP/PA (18 @ 20:07) Study limited due to rotation. Overlying chin obscures right lung apex. No gross consolidation or pleural effusion. Chronic rib deformities. Chronic right clavicle deformity.          MICROBIOLOGY DATA:    Culture - Urine (18 @ 12:24)    -  Amikacin: S <=8    -  Amoxicillin/Clavulanic Acid: S <=8/4    -  Ampicillin: S 4 These ampicillin results predict results for amoxicillin    -  Ampicillin/Sulbactam: S <=4/2    -  Aztreonam: S <=4    -  Cefazolin: S <=2 This predicts results for oral agents cefaclor, cefdinir, cefpodoxime, cefprozil, cefuroxime axetil, cephalexin and locarbef for uncomplicated UTI. Note that some isolates may be susceptible to these agents while testing resistant to cefazolin.    -  Cefepime: S <=2    -  Cefoxitin: S <=4    -  Ceftriaxone: S <=1 Enterobacter, Citrobacter, and Serratia may develop resistance during prolonged therapy    -  Ciprofloxacin: S <=0.5    -  Ertapenem: S <=0.5    -  Gentamicin: S <=1    -  Imipenem: S <=1    -  Levofloxacin: S <=1    -  Meropenem: S <=1    -  Nitrofurantoin: S <=32 Should not be used to treat pyelonephritis    -  Piperacillin/Tazobactam: S <=8    -  Tigecycline: S <=1    -  Tobramycin: S <=2    -  Trimethoprim/Sulfamethoxazole: S <=0.5/9.5    Specimen Source: .Urine Catheterized    Culture Results:   >100,000 CFU/ml Escherichia coli    Organism Identification: Escherichia coli    Organism: Escherichia coli    Method Type: [facility]      Rapid Respiratory Viral Panel (18 @ 23:42)    Rapid RVP Result: NotDetec: The FilmArray RVP Rapid uses polymerase chain reaction (PCR) and melt  curve analysis to screen for adenovirus; coronavirus HKU1, NL63, 229E,  OC43; human metapneumovirus (hMPV); human enterovirus/rhinovirus  (Entero/RV); influenza A; influenza A/H1;influenza A/H3; influenza  A/H1-2009; influenza B; parainfluenza viruses 1, 2, 3, 4; respiratory  syncytial virus; Bordetella pertussis; Mycoplasma pneumoniae; and  Chlamydophila pneumoniae.    Culture - Blood (18 @ 23:13)    Specimen Source: .Blood Blood-Peripheral    Culture Results:   No growth to date.    Culture - Blood (05.12.18 @ 23:13)    Specimen Source: .Blood Blood-Peripheral    Culture Results:   No growth to date.        Rapid Respiratory Viral Panel (18 @ 23:42)    Rapid RVP Result: NotDete: The FilmArray RVP Rapid uses polymerase chain reaction (PCR) and melt  curve analysis to screen for adenovirus; coronavirus HKU1, NL63, 229E,  OC43; human metapneumovirus (hMPV); human enterovirus/rhinovirus  (Entero/RV); influenza A; influenza A/H1;influenza A/H3; influenza  A/H1-2009; influenza B; parainfluenza viruses 1, 2, 3, 4; respiratory  syncytial virus; Bordetella pertussis; Mycoplasma pneumoniae; and  Chlamydophila pneumoniae.

## 2018-05-15 NOTE — PROGRESS NOTE ADULT - SUBJECTIVE AND OBJECTIVE BOX
PGY 1 Note discussed with supervising resident and primary attending    Patient is a 89y old  Female who presents with a chief complaint of chills and fever (13 May 2018 00:10)      INTERVAL HPI/OVERNIGHT EVENTS: Discussed plan of care with daughter at bedside     MEDICATIONS  (STANDING):  ALBUTerol/ipratropium for Nebulization 3 milliLiter(s) Nebulizer every 6 hours  docusate sodium 100 milliGRAM(s) Oral two times a day  heparin  Injectable 5000 Unit(s) SubCutaneous every 12 hours  multivitamin/minerals 1 Tablet(s) Oral daily  pantoprazole    Tablet 40 milliGRAM(s) Oral before breakfast  piperacillin/tazobactam IVPB. 3.375 Gram(s) IV Intermittent every 8 hours  polyethylene glycol 3350 17 Gram(s) Oral daily  senna 2 Tablet(s) Oral at bedtime  simvastatin 10 milliGRAM(s) Oral at bedtime  sodium chloride 0.9%. 1000 milliLiter(s) (50 mL/Hr) IV Continuous <Continuous>    MEDICATIONS  (PRN):  acetaminophen   Tablet 650 milliGRAM(s) Oral every 6 hours PRN For Temp greater than 38 C (100.4 F)  acetaminophen   Tablet. 650 milliGRAM(s) Oral every 6 hours PRN Mild Pain (1 - 3)      __________________________________________________  REVIEW OF SYSTEMS:    CONSTITUTIONAL: No fever,   EYES: no acute visual disturbances  NECK: No pain or stiffness  RESPIRATORY: No cough; No shortness of breath  CARDIOVASCULAR: No chest pain, no palpitations  GASTROINTESTINAL: No pain. No nausea or vomiting; No diarrhea   NEUROLOGICAL: No headache or numbness, no tremors  MUSCULOSKELETAL: No joint pain, no muscle pain  GENITOURINARY: no dysuria, no frequency, no hesitancy  PSYCHIATRY: no depression , no anxiety  ALL OTHER  ROS negative        Vital Signs Last 24 Hrs  T(C): 37 (15 May 2018 07:10), Max: 37 (15 May 2018 07:10)  T(F): 98.6 (15 May 2018 07:10), Max: 98.6 (15 May 2018 07:10)  HR: 89 (15 May 2018 07:10) (83 - 89)  BP: 128/69 (15 May 2018 07:10) (102/46 - 128/69)  BP(mean): --  RR: 16 (15 May 2018 07:10) (16 - 18)  SpO2: 94% (15 May 2018 07:10) (91% - 97%)    ________________________________________________  PHYSICAL EXAM:  GENERAL: NAD  HEENT: Normocephalic;  conjunctivae and sclerae clear; moist mucous membranes;   NECK : supple  CHEST/LUNG: Reduced breath sounds in Right lower lobe    HEART: S1 S2  regular; no murmurs, gallops or rubs  ABDOMEN: Soft, Nontender, Nondistended; Bowel sounds present  EXTREMITIES: no cyanosis; no edema; no calf tenderness  SKIN: a Stage 1 Pressure Injury to the L. Elbow and Bilateral Heels; presenting as non-blanchable erythema  NERVOUS SYSTEM:  AAO*1     _________________________________________________  LABS:                        9.4    9.9   )-----------( 148      ( 15 May 2018 07:20 )             29.4     05-15    140  |  111<H>  |  14  ----------------------------<  86  4.0   |  22  |  1.44<H>    Ca    8.1<L>      15 May 2018 07:20  Phos  2.9     05-15  Mg     2.1     05-15    TPro  6.0  /  Alb  2.8<L>  /  TBili  0.5  /  DBili  x   /  AST  28  /  ALT  12  /  AlkPhos  62  05-14        CAPILLARY BLOOD GLUCOSE        Consultant(s) Notes Reviewed:   YES    Care Discussed with Consultants : YES    Plan of care was discussed with patient and /or primary care giver; all questions and concerns were addressed and care was aligned with patient's wishes.

## 2018-05-15 NOTE — PROGRESS NOTE ADULT - PROBLEM SELECTOR PLAN 5
Continue trejo for now.  When a little more stable, would try TOV.  May need urology f/u outpatient or inpatient.

## 2018-05-15 NOTE — PROGRESS NOTE ADULT - SUBJECTIVE AND OBJECTIVE BOX
Time of Visit:  Patient seen and examined.     MEDICATIONS  (STANDING):  ALBUTerol/ipratropium for Nebulization 3 milliLiter(s) Nebulizer every 6 hours  docusate sodium 100 milliGRAM(s) Oral two times a day  heparin  Injectable 5000 Unit(s) SubCutaneous every 12 hours  multivitamin/minerals 1 Tablet(s) Oral daily  pantoprazole    Tablet 40 milliGRAM(s) Oral before breakfast  piperacillin/tazobactam IVPB. 3.375 Gram(s) IV Intermittent every 8 hours  polyethylene glycol 3350 17 Gram(s) Oral daily  senna 2 Tablet(s) Oral at bedtime  simvastatin 10 milliGRAM(s) Oral at bedtime  sodium chloride 0.9%. 1000 milliLiter(s) (50 mL/Hr) IV Continuous <Continuous>      MEDICATIONS  (PRN):  acetaminophen   Tablet 650 milliGRAM(s) Oral every 6 hours PRN For Temp greater than 38 C (100.4 F)  acetaminophen   Tablet. 650 milliGRAM(s) Oral every 6 hours PRN Mild Pain (1 - 3)       Medications up to date at time of exam.    ROS: Nofever, chills, cough, congestion, SOB.  PHYSICAL EXAMINATION:    Vital Signs Last 24 Hrs  T(C): 37 (15 May 2018 07:10), Max: 37 (15 May 2018 07:10)  T(F): 98.6 (15 May 2018 07:10), Max: 98.6 (15 May 2018 07:10)  HR: 89 (15 May 2018 07:10) (83 - 89)  BP: 128/69 (15 May 2018 07:10) (102/46 - 128/69)  BP(mean): --  RR: 16 (15 May 2018 07:10) (16 - 18)  SpO2: 94% (15 May 2018 07:10) (91% - 97%)   (if applicable)    General: Alert and oriented. No acute distress.     HEENT: Normocephalic and atraumatic. No nasal tenderness. Extraocular muscles are intact. Moist mucosa. [place].    NECK: Supple, no palpable adenopathy.    LUNGS: Clear to auscultation, no wheezing, rales, or rhonchi. No use of accessory muscle.      HEART: S1 S2 Regular rate and no click/ rub.     ABDOMEN: Soft, nontender, and nondistended.  No hepatosplenomegaly is noted. Active bowel sounds.     EXTREMITIES: Without any cyanosis, clubbing, rash, lesions or edema.    NEUROLOGIC: Awake, alert, oriented.     SKIN: Warm and moist. Non diaphoretic       LABS:                        9.4    9.9   )-----------( 148      ( 15 May 2018 07:20 )             29.4     05-15    140  |  111<H>  |  14  ----------------------------<  86  4.0   |  22  |  1.44<H>    Ca    8.1<L>      15 May 2018 07:20  Phos  2.9     05-15  Mg     2.1     05-15    TPro  6.0  /  Alb  2.8<L>  /  TBili  0.5  /  DBili  x   /  AST  28  /  ALT  12  /  AlkPhos  62  05-14                Serum Pro-Brain Natriuretic Peptide: 789 pg/mL (05-12-18 @ 20:23)      Procalcitonin, Serum: 26.27 ng/mL (05-14-18 @ 08:34)      MICROBIOLOGY: (if applicable)    RADIOLOGY & ADDITIONAL STUDIES:  EKG:   CXR: < from: Xray Chest 1 View AP/PA (05.12.18 @ 20:07) >    INTERPRETATION:  CLINICAL STATEMENT: Chest Pain.    TECHNIQUE: AP view of the chest.    COMPARISON: None available.    FINDINGS/  IMPRESSION:  Study limited due to rotation. Overlying chin obscures right lung apex.    No gross consolidation or pleural effusion.    Chronic rib deformities. Chronic right clavicle deformity.    Nonspecific small nodular density overlies right upper lung zone.   Nonemergent CT chest can be obtained. If clinically warranted    Heart size cannot be accurately assessed in this projection.      PAST MEDICAL & SURGICAL HISTORY:  TIA (transient ischemic attack)  Hypercholesterolemia  HTN (hypertension)  No significant past surgical history     Impression; 89 Y/O Female with prior mentioned multiple chronic conditions. Brought to ED with c/o chills, fever, SOB due to UTI. RVP negative, Afebrile. Had Leukocytosis , now improved WBC 9.9. + UTI e.coli    Suggestions:    Continue DuoNeb Q 6 hours.  Oxygen supplementation to keep o2 saturation >90%.  Continue antibiotic per ID recommendation.  Aspiration precautions   DVT and GI prophylaxis. Time of Visit:  Patient seen and examined.     MEDICATIONS  (STANDING):  ALBUTerol/ipratropium for Nebulization 3 milliLiter(s) Nebulizer every 6 hours  docusate sodium 100 milliGRAM(s) Oral two times a day  heparin  Injectable 5000 Unit(s) SubCutaneous every 12 hours  multivitamin/minerals 1 Tablet(s) Oral daily  pantoprazole    Tablet 40 milliGRAM(s) Oral before breakfast  piperacillin/tazobactam IVPB. 3.375 Gram(s) IV Intermittent every 8 hours  polyethylene glycol 3350 17 Gram(s) Oral daily  senna 2 Tablet(s) Oral at bedtime  simvastatin 10 milliGRAM(s) Oral at bedtime  sodium chloride 0.9%. 1000 milliLiter(s) (50 mL/Hr) IV Continuous <Continuous>      MEDICATIONS  (PRN):  acetaminophen   Tablet 650 milliGRAM(s) Oral every 6 hours PRN For Temp greater than 38 C (100.4 F)  acetaminophen   Tablet. 650 milliGRAM(s) Oral every 6 hours PRN Mild Pain (1 - 3)       Medications up to date at time of exam.    ROS: Nofever, chills, cough, congestion, SOB.  PHYSICAL EXAMINATION:    Vital Signs Last 24 Hrs  T(C): 37 (15 May 2018 07:10), Max: 37 (15 May 2018 07:10)  T(F): 98.6 (15 May 2018 07:10), Max: 98.6 (15 May 2018 07:10)  HR: 89 (15 May 2018 07:10) (83 - 89)  BP: 128/69 (15 May 2018 07:10) (102/46 - 128/69)  BP(mean): --  RR: 16 (15 May 2018 07:10) (16 - 18)  SpO2: 94% (15 May 2018 07:10) (91% - 97%)   (if applicable)    General: Alert and oriented. No acute distress.     HEENT: Normocephalic and atraumatic. No nasal tenderness. Extraocular muscles are intact. Moist mucosa. [place].    NECK: Supple, no palpable adenopathy.    LUNGS: Clear to auscultation, no wheezing, rales, or rhonchi. No use of accessory muscle.      HEART: S1 S2 Regular rate and no click/ rub.     ABDOMEN: Soft, nontender, and nondistended.  No hepatosplenomegaly is noted. Active bowel sounds.     EXTREMITIES: Without any cyanosis, clubbing, rash, lesions or edema.    NEUROLOGIC: Awake, alert, oriented.     SKIN: Warm and moist. Non diaphoretic       LABS:                        9.4    9.9   )-----------( 148      ( 15 May 2018 07:20 )             29.4     05-15    140  |  111<H>  |  14  ----------------------------<  86  4.0   |  22  |  1.44<H>    Ca    8.1<L>      15 May 2018 07:20  Phos  2.9     05-15  Mg     2.1     05-15    TPro  6.0  /  Alb  2.8<L>  /  TBili  0.5  /  DBili  x   /  AST  28  /  ALT  12  /  AlkPhos  62  05-14                Serum Pro-Brain Natriuretic Peptide: 789 pg/mL (05-12-18 @ 20:23)      Procalcitonin, Serum: 26.27 ng/mL (05-14-18 @ 08:34)      MICROBIOLOGY: (if applicable)    RADIOLOGY & ADDITIONAL STUDIES:  EKG:   CXR: < from: Xray Chest 1 View AP/PA (05.12.18 @ 20:07) >    INTERPRETATION:  CLINICAL STATEMENT: Chest Pain.    TECHNIQUE: AP view of the chest.    COMPARISON: None available.    FINDINGS/  IMPRESSION:  Study limited due to rotation. Overlying chin obscures right lung apex.    No gross consolidation or pleural effusion.    Chronic rib deformities. Chronic right clavicle deformity.    Nonspecific small nodular density overlies right upper lung zone.   Nonemergent CT chest can be obtained. If clinically warranted    Heart size cannot be accurately assessed in this projection.      PAST MEDICAL & SURGICAL HISTORY:  TIA (transient ischemic attack)  Hypercholesterolemia  HTN (hypertension)  No significant past surgical history     Impression; 89 Y/O Female with prior mentioned multiple chronic conditions. Brought to ED with c/o chills, fever, SOB due to UTI. RVP negative, Afebrile. Had Leukocytosis , now improved WBC 9.9. + UTI e.coli    Suggestions:    Continue DuoNeb Q 6 hours.  Oxygen supplementation to keep o2 saturation >90%.  Continue antibiotic per ID recommendation.  Aspiration precautions   DVT and GI prophylaxis.       Agree with above assessment and plan as transcribed.

## 2018-05-15 NOTE — PROGRESS NOTE ADULT - ATTENDING COMMENTS
I agree with the above information   pt seen and examined at bedside with pt's daughter at bedside  all questions answered accordingly  of note, sepsis resolved, bacterial pna improving  urinary retention, s/p trejo  alejo mildly improving  constipation - bowel regimen

## 2018-05-15 NOTE — PROGRESS NOTE ADULT - ASSESSMENT
She is a 89 yo female with dementia, HTN, hyperlipidemia and a prior TIA, brought in to the ER from home for evaluation of chills and fever.  On admission, she found to have fever, Leukocytosis, and mildly positive urine analysis. The RVP and CXR is Negative.  She has started on Zosyn and The ID consult requested to assist with further evaluation and antibiotic management.     # Sepsis  # UTI  - E.coli    Would recommend:    1. Follow up final Urine culture for sensitivity of E.coli  2. Monitor WBC count, is trending up  3. Change Ceftriaxone to Zosyn until sensitivity of E.coli is available   4. Aspiration precaution    d/w  Nursing staff She is a 89 yo female with dementia, HTN, hyperlipidemia and a prior TIA, brought in to the ER from home for evaluation of chills and fever.  On admission, she found to have fever, Leukocytosis, and mildly positive urine analysis. The RVP and CXR is Negative.  She has started on Zosyn and The ID consult requested to assist with further evaluation and antibiotic management.     # Sepsis  # UTI  - E.coli    Would recommend:    1. Continue Zosyn and may change to oral Levaquin on discharge to continue until 5/20/18  2. Aspiration precaution  3. OOB to chair    d/w  Nursing staff

## 2018-05-15 NOTE — PROGRESS NOTE ADULT - PROBLEM SELECTOR PLAN 5
Likely from Obstruction / ATN in setting of sepsis  Placed trejo today   Monitor uop and monitor serum creatinine   f/u Dr. Nino   Kidney cyst of no consequence given the benign appearance and age of patient.

## 2018-05-15 NOTE — PROGRESS NOTE ADULT - PROBLEM SELECTOR PLAN 7
VTE IMPROVE VTE Individual Risk Assessment          RISK                                                          Points  [  ] Previous VTE                                                3  [  ] Thrombophilia                                             2  [  ] Lower limb paralysis                                   2        (unable to hold up >15 seconds)    [  ] Current Cancer                                             2         (within 6 months)  [z  ] Immobilization > 24 hrs                              1  [  ] ICU/CCU stay > 24 hours                             1  [ x ] Age > 60                                                         1    IMPROVE VTE Score:   VTE score 2 will give heparin for DVT prophylaxis

## 2018-05-15 NOTE — PROGRESS NOTE ADULT - ATTENDING COMMENTS
[facility] NEPHROLOGY  Rony Nino M.D.  Lukas Lozada D.O.  Lorna Carney M.D.  Anahi Smith, MSN, ANP-C    Telephone: [phone]  Facsimile: [phone]    [address] [place] [address]

## 2018-05-16 LAB
ANION GAP SERPL CALC-SCNC: 5 MMOL/L — SIGNIFICANT CHANGE UP (ref 5–17)
BASOPHILS # BLD AUTO: 0.1 K/UL — SIGNIFICANT CHANGE UP (ref 0–0.2)
BASOPHILS NFR BLD AUTO: 1.7 % — SIGNIFICANT CHANGE UP (ref 0–2)
BUN SERPL-MCNC: 12 MG/DL — SIGNIFICANT CHANGE UP (ref 7–18)
CALCIUM SERPL-MCNC: 8.2 MG/DL — LOW (ref 8.4–10.5)
CHLORIDE SERPL-SCNC: 111 MMOL/L — HIGH (ref 96–108)
CO2 SERPL-SCNC: 25 MMOL/L — SIGNIFICANT CHANGE UP (ref 22–31)
CREAT ?TM UR-MCNC: 45 MG/DL — SIGNIFICANT CHANGE UP
CREAT SERPL-MCNC: 1.47 MG/DL — HIGH (ref 0.5–1.3)
EOSINOPHIL # BLD AUTO: 0.3 K/UL — SIGNIFICANT CHANGE UP (ref 0–0.5)
EOSINOPHIL NFR BLD AUTO: 3.9 % — SIGNIFICANT CHANGE UP (ref 0–6)
GLUCOSE SERPL-MCNC: 89 MG/DL — SIGNIFICANT CHANGE UP (ref 70–99)
HCT VFR BLD CALC: 29 % — LOW (ref 34.5–45)
HGB BLD-MCNC: 8.9 G/DL — LOW (ref 11.5–15.5)
LEGIONELLA AG UR QL: NEGATIVE — SIGNIFICANT CHANGE UP
LYMPHOCYTES # BLD AUTO: 1.5 K/UL — SIGNIFICANT CHANGE UP (ref 1–3.3)
LYMPHOCYTES # BLD AUTO: 21.8 % — SIGNIFICANT CHANGE UP (ref 13–44)
MAGNESIUM SERPL-MCNC: 2 MG/DL — SIGNIFICANT CHANGE UP (ref 1.6–2.6)
MCHC RBC-ENTMCNC: 27.8 PG — SIGNIFICANT CHANGE UP (ref 27–34)
MCHC RBC-ENTMCNC: 30.8 GM/DL — LOW (ref 32–36)
MCV RBC AUTO: 90.2 FL — SIGNIFICANT CHANGE UP (ref 80–100)
MONOCYTES # BLD AUTO: 0.5 K/UL — SIGNIFICANT CHANGE UP (ref 0–0.9)
MONOCYTES NFR BLD AUTO: 7.6 % — SIGNIFICANT CHANGE UP (ref 2–14)
NEUTROPHILS # BLD AUTO: 4.4 K/UL — SIGNIFICANT CHANGE UP (ref 1.8–7.4)
NEUTROPHILS NFR BLD AUTO: 65.1 % — SIGNIFICANT CHANGE UP (ref 43–77)
OSMOLALITY UR: 277 MOS/KG — SIGNIFICANT CHANGE UP (ref 50–1200)
PHOSPHATE SERPL-MCNC: 3.4 MG/DL — SIGNIFICANT CHANGE UP (ref 2.5–4.5)
PLATELET # BLD AUTO: 164 K/UL — SIGNIFICANT CHANGE UP (ref 150–400)
POTASSIUM SERPL-MCNC: 3.7 MMOL/L — SIGNIFICANT CHANGE UP (ref 3.5–5.3)
POTASSIUM SERPL-SCNC: 3.7 MMOL/L — SIGNIFICANT CHANGE UP (ref 3.5–5.3)
PROT ?TM UR-MCNC: 14 MG/DL — HIGH (ref 0–12)
RBC # BLD: 3.22 M/UL — LOW (ref 3.8–5.2)
RBC # FLD: 13.8 % — SIGNIFICANT CHANGE UP (ref 10.3–14.5)
SODIUM SERPL-SCNC: 141 MMOL/L — SIGNIFICANT CHANGE UP (ref 135–145)
SODIUM UR-SCNC: 76 MMOL/L — SIGNIFICANT CHANGE UP (ref 40–220)
WBC # BLD: 6.7 K/UL — SIGNIFICANT CHANGE UP (ref 3.8–10.5)
WBC # FLD AUTO: 6.7 K/UL — SIGNIFICANT CHANGE UP (ref 3.8–10.5)

## 2018-05-16 RX ORDER — SODIUM CHLORIDE 9 MG/ML
1000 INJECTION INTRAMUSCULAR; INTRAVENOUS; SUBCUTANEOUS
Qty: 0 | Refills: 0 | Status: DISCONTINUED | OUTPATIENT
Start: 2018-05-16 | End: 2018-05-17

## 2018-05-16 RX ORDER — LANOLIN ALCOHOL/MO/W.PET/CERES
3 CREAM (GRAM) TOPICAL AT BEDTIME
Qty: 0 | Refills: 0 | Status: DISCONTINUED | OUTPATIENT
Start: 2018-05-16 | End: 2018-05-17

## 2018-05-16 RX ADMIN — SENNA PLUS 2 TABLET(S): 8.6 TABLET ORAL at 21:27

## 2018-05-16 RX ADMIN — Medication 3 MILLILITER(S): at 21:14

## 2018-05-16 RX ADMIN — Medication 3 MILLILITER(S): at 15:53

## 2018-05-16 RX ADMIN — SIMVASTATIN 10 MILLIGRAM(S): 20 TABLET, FILM COATED ORAL at 21:27

## 2018-05-16 RX ADMIN — PANTOPRAZOLE SODIUM 40 MILLIGRAM(S): 20 TABLET, DELAYED RELEASE ORAL at 05:32

## 2018-05-16 RX ADMIN — PIPERACILLIN AND TAZOBACTAM 25 GRAM(S): 4; .5 INJECTION, POWDER, LYOPHILIZED, FOR SOLUTION INTRAVENOUS at 05:35

## 2018-05-16 RX ADMIN — SODIUM CHLORIDE 60 MILLILITER(S): 9 INJECTION INTRAMUSCULAR; INTRAVENOUS; SUBCUTANEOUS at 18:19

## 2018-05-16 RX ADMIN — PIPERACILLIN AND TAZOBACTAM 25 GRAM(S): 4; .5 INJECTION, POWDER, LYOPHILIZED, FOR SOLUTION INTRAVENOUS at 13:52

## 2018-05-16 RX ADMIN — Medication 100 MILLIGRAM(S): at 05:32

## 2018-05-16 RX ADMIN — Medication 3 MILLILITER(S): at 10:05

## 2018-05-16 RX ADMIN — SODIUM CHLORIDE 60 MILLILITER(S): 9 INJECTION INTRAMUSCULAR; INTRAVENOUS; SUBCUTANEOUS at 21:28

## 2018-05-16 RX ADMIN — Medication 3 MILLILITER(S): at 03:48

## 2018-05-16 RX ADMIN — HEPARIN SODIUM 5000 UNIT(S): 5000 INJECTION INTRAVENOUS; SUBCUTANEOUS at 18:19

## 2018-05-16 RX ADMIN — HEPARIN SODIUM 5000 UNIT(S): 5000 INJECTION INTRAVENOUS; SUBCUTANEOUS at 05:33

## 2018-05-16 RX ADMIN — Medication 3 MILLIGRAM(S): at 21:26

## 2018-05-16 RX ADMIN — PIPERACILLIN AND TAZOBACTAM 25 GRAM(S): 4; .5 INJECTION, POWDER, LYOPHILIZED, FOR SOLUTION INTRAVENOUS at 21:27

## 2018-05-16 RX ADMIN — Medication 1 TABLET(S): at 12:41

## 2018-05-16 NOTE — DIETITIAN INITIAL EVALUATION ADULT. - PROBLEM SELECTOR PLAN 2
CXR shows RLL infiltrate  f/u procalcitonin   f/u strep antigen and blood cultures   Ceftriaxone and Azithromycin

## 2018-05-16 NOTE — PROGRESS NOTE ADULT - ATTENDING COMMENTS
[facility] NEPHROLOGY  Rony Nino M.D.  Lukas Lozada D.O.  Lorna Carney M.D.  Anahi Smith, MSN, ANP-C    Telephone: [phone]  Facsimile: [phone]    [address] [place] [address]

## 2018-05-16 NOTE — PROGRESS NOTE ADULT - ASSESSMENT
She is a 89 yo female with dementia, HTN, hyperlipidemia and a prior TIA, brought in to the ER from home for evaluation of chills and fever.  On admission, she found to have fever, Leukocytosis, and mildly positive urine analysis. The RVP and CXR is Negative.  She has started on Zosyn and The ID consult requested to assist with further evaluation and antibiotic management.     # Sepsis  # UTI  - E.coli    Would recommend:    1. Continue Zosyn and may change to oral Levaquin on discharge to continue until 5/20/18  2. Aspiration precaution  3. OOB to chair    d/w  Nursing staff

## 2018-05-16 NOTE — DIETITIAN INITIAL EVALUATION ADULT. - OTHER INFO
Pt Visited. D/W Daughter (Catherine)at bedside. Reports Good appetite at home.  Daughter Requesting to  D/c  [place] liquids  since pt dislikes it. At home pt was on thin liquids per  daughter. Daughter brings ethnic food from Home ..Pt with PU R heel stage 1 and  Left heel, Stage 1 @ L elbow.

## 2018-05-16 NOTE — PROGRESS NOTE ADULT - PROBLEM SELECTOR PLAN 2
CXR shows RLL infiltrate  f/u procalcitonin   f/u strep antigen and blood cultures   Continue Zosyn
Continue abx.
CXR shows RLL infiltrate  f/u procalcitonin   f/u strep antigen and blood cultures   Continue Zosyn
CXR shows RLL infiltrate  f/u procalcitonin   f/u strep antigen and blood cultures   Continue Zosyn
Continue abx.

## 2018-05-16 NOTE — PROGRESS NOTE ADULT - PROBLEM SELECTOR PLAN 8
a Stage 1 Pressure Injury to the L. Elbow and Bilateral Heels; presenting as non-blanchable erythema  Recs : -Offload the patients L. Elbow using pillows  -Elevate/float the patients heels using heel protectors and reposition the patient Q 2hrs using wedges or pillows

## 2018-05-16 NOTE — PROGRESS NOTE ADULT - PROBLEM SELECTOR PROBLEM 1
Acute kidney failure with lesion of tubular necrosis
Sepsis
Sepsis
Acute kidney failure with lesion of tubular necrosis
Sepsis

## 2018-05-16 NOTE — PROGRESS NOTE ADULT - PROBLEM SELECTOR PROBLEM 3
Acute cystitis without hematuria
HTN (hypertension)
Acute cystitis without hematuria
HTN (hypertension)
HTN (hypertension)

## 2018-05-16 NOTE — PROGRESS NOTE ADULT - SUBJECTIVE AND OBJECTIVE BOX
Pt interviewed and examined. Full note to follow. [facility] NEPHROLOGY- CONSULTATION NOTE    Patient is a 89y F with MIN in the setting of sepsis/UTI with hypotension, also s/p IV contrast and possible contrast-induced nephropathy.  Pt also with urinary retention and is s/p trejo placement.  Pt passed TOV today.    REVIEW OF SYSTEMS: unable to obtain    VITALS:  T(F): 98 (18 @ 16:01), Max: 98.1 (05-15-18 @ 23:50)  HR: 88 (18 @ 16:01)  BP: 134/86 (18 @ 16:01)  RR: 16 (18 @ 16:01)  SpO2: 97% (18 @ 16:01)  Wt(kg): --    05-15 @ 07:01  -   @ 07:00  --------------------------------------------------------  IN: 0 mL / OUT: 1430 mL / NET: -1430 mL     @ 07:01  -   @ 22:57  --------------------------------------------------------  IN: 0 mL / OUT: 250 mL / NET: -250 mL    PHYSICAL EXAM:  Constitutional: NAD  HEENT: anicteric sclera, oropharynx clear, MMM  Respiratory: CTAB, no wheezes, rales or rhonchi  Cardiovascular: S1, S2, RRR  Gastrointestinal: BS+, soft, NT/ND  Extremities: No cyanosis or clubbing. No peripheral edema  Neurological: Awake, minimally alert.  : No CVA tenderness. no trejo.  Skin: No rashes        LABS:      141  |  111<H>  |  12  ----------------------------<  89  3.7   |  25  |  1.47<H>    Ca    8.2<L>      16 May 2018 07:00  Phos  3.4       Mg     2.0           Creatinine Trend: 1.47 <--, 1.44 <--, 1.53 <--, 1.10 <--                        8.9    6.7   )-----------( 164      ( 16 May 2018 07:00 )             29.0     Urine Studies:  Urinalysis Basic - ( 12 May 2018 21:09 )    Color: Yellow / Appearance: Clear / S.010 / pH:   Gluc:  / Ketone: Negative  / Bili: Negative / Urobili: Negative   Blood:  / Protein: Negative / Nitrite: Positive   Leuk Esterase: Small / RBC: 2-5 /HPF / WBC 3-5 /HPF   Sq Epi:  / Non Sq Epi: Occasional /HPF / Bacteria: TNTC /HPF      Sodium, Random Urine: 76 mmol/L ( @ 15:03)  Creatinine, Random Urine: 45 mg/dL ( @ 15:03)  Osmolality, Random Urine: 277 mos/kg ( @ 15:03)

## 2018-05-16 NOTE — PROGRESS NOTE ADULT - SUBJECTIVE AND OBJECTIVE BOX
Patient seen and examined.     MEDICATIONS  (STANDING):  ALBUTerol/ipratropium for Nebulization 3 milliLiter(s) Nebulizer every 6 hours  docusate sodium 100 milliGRAM(s) Oral two times a day  heparin  Injectable 5000 Unit(s) SubCutaneous every 12 hours  multivitamin/minerals 1 Tablet(s) Oral daily  pantoprazole    Tablet 40 milliGRAM(s) Oral before breakfast  piperacillin/tazobactam IVPB. 3.375 Gram(s) IV Intermittent every 8 hours  polyethylene glycol 3350 17 Gram(s) Oral daily  senna 2 Tablet(s) Oral at bedtime  simvastatin 10 milliGRAM(s) Oral at bedtime      MEDICATIONS  (PRN):  acetaminophen   Tablet 650 milliGRAM(s) Oral every 6 hours PRN For Temp greater than 38 C (100.4 F)  acetaminophen   Tablet. 650 milliGRAM(s) Oral every 6 hours PRN Mild Pain (1 - 3)     Medications up to date at time of exam.    PHYSICAL EXAMINATION:  Patient has no new complaints.  GENERAL: The patient is a well-developed, well-nourished, in no apparent distress.     Vital Signs Last 24 Hrs  T(C): 36.4 (16 May 2018 08:23), Max: 36.7 (15 May 2018 23:50)  T(F): 97.6 (16 May 2018 08:23), Max: 98.1 (15 May 2018 23:50)  HR: 83 (16 May 2018 08:23) (83 - 108)  BP: 123/97 (16 May 2018 08:23) (110/57 - 123/97)  BP(mean): --  RR: 17 (16 May 2018 08:23) (16 - 17)  SpO2: 96% (16 May 2018 08:23) (95% - 96%)   (if applicable)    Chest Tube (if applicable)    HEENT: Head is normocephalic and atraumatic.     NECK: Supple, no palpable adenopathy.    LUNGS: Clear to auscultation, no wheezing, rales, or rhonchi.    HEART: Regular rate and rhythm without murmur.    ABDOMEN: Soft, nontender, and nondistended.      EXTREMITIES: Without any cyanosis, clubbing, rash, lesions or edema.    NEUROLOGIC: Awake, alert.    SKIN: Warm, dry, good turgor.    LABS:                        8.9    6.7   )-----------( 164      ( 16 May 2018 07:00 )             29.0     05-16    141  |  111<H>  |  12  ----------------------------<  89  3.7   |  25  |  1.47<H>    Ca    8.2<L>      16 May 2018 07:00  Phos  3.4     05-16  Mg     2.0     05-16                      Procalcitonin, Serum: 26.27 ng/mL (05-14-18 @ 08:34)      MICROBIOLOGY: (if applicable)    RADIOLOGY & ADDITIONAL STUDIES:  EKG:   CXR:  ECHO:    IMPRESSION: 89y Female PAST MEDICAL & SURGICAL HISTORY:  TIA (transient ischemic attack)  Hypercholesterolemia  HTN (hypertension)  No significant past surgical history       89 year old female from lives with her daughter, needs 24 hr Assistance with  ADL's, HHA 12 hrs. Past medical H/o of dementia, HTN, TIA, HLD brought to ED with c/o chills, fever, SOB due to UTI. RVP negative    +UTI e.coli  +leukocytosis, improved  +procalcitonin 26  +MIN, improved    Patient sitting in chair, more awake and alert today    RECOMMENDATIONS:   - con't with antibiotics as per ID recommendations.    - con't with bronchodilators, o2 supplement as needed.    - aspiration precautions   - DVT and GI prophylaxis. Patient seen and examined.     MEDICATIONS  (STANDING):  ALBUTerol/ipratropium for Nebulization 3 milliLiter(s) Nebulizer every 6 hours  docusate sodium 100 milliGRAM(s) Oral two times a day  heparin  Injectable 5000 Unit(s) SubCutaneous every 12 hours  multivitamin/minerals 1 Tablet(s) Oral daily  pantoprazole    Tablet 40 milliGRAM(s) Oral before breakfast  piperacillin/tazobactam IVPB. 3.375 Gram(s) IV Intermittent every 8 hours  polyethylene glycol 3350 17 Gram(s) Oral daily  senna 2 Tablet(s) Oral at bedtime  simvastatin 10 milliGRAM(s) Oral at bedtime      MEDICATIONS  (PRN):  acetaminophen   Tablet 650 milliGRAM(s) Oral every 6 hours PRN For Temp greater than 38 C (100.4 F)  acetaminophen   Tablet. 650 milliGRAM(s) Oral every 6 hours PRN Mild Pain (1 - 3)     Medications up to date at time of exam.    PHYSICAL EXAMINATION:  Patient has no new complaints.  GENERAL: The patient is a well-developed, well-nourished, in no apparent distress.     Vital Signs Last 24 Hrs  T(C): 36.4 (16 May 2018 08:23), Max: 36.7 (15 May 2018 23:50)  T(F): 97.6 (16 May 2018 08:23), Max: 98.1 (15 May 2018 23:50)  HR: 83 (16 May 2018 08:23) (83 - 108)  BP: 123/97 (16 May 2018 08:23) (110/57 - 123/97)  BP(mean): --  RR: 17 (16 May 2018 08:23) (16 - 17)  SpO2: 96% (16 May 2018 08:23) (95% - 96%)   (if applicable)    Chest Tube (if applicable)    HEENT: Head is normocephalic and atraumatic.     NECK: Supple, no palpable adenopathy.    LUNGS: Clear to auscultation, no wheezing, rales, or rhonchi.    HEART: Regular rate and rhythm without murmur.    ABDOMEN: Soft, nontender, and nondistended.      EXTREMITIES: Without any cyanosis, clubbing, rash, lesions or edema.    NEUROLOGIC: Awake, alert.    SKIN: Warm, dry, good turgor.    LABS:                        8.9    6.7   )-----------( 164      ( 16 May 2018 07:00 )             29.0     05-16    141  |  111<H>  |  12  ----------------------------<  89  3.7   |  25  |  1.47<H>    Ca    8.2<L>      16 May 2018 07:00  Phos  3.4     05-16  Mg     2.0     05-16                      Procalcitonin, Serum: 26.27 ng/mL (05-14-18 @ 08:34)      MICROBIOLOGY: (if applicable)    RADIOLOGY & ADDITIONAL STUDIES:  EKG:   CXR:  ECHO:    IMPRESSION: 89y Female PAST MEDICAL & SURGICAL HISTORY:  TIA (transient ischemic attack)  Hypercholesterolemia  HTN (hypertension)  No significant past surgical history       89 year old female from lives with her daughter, needs 24 hr Assistance with  ADL's, HHA 12 hrs. Past medical H/o of dementia, HTN, TIA, HLD brought to ED with c/o chills, fever, SOB due to UTI. RVP negative    +UTI e.coli  +leukocytosis, improved  +procalcitonin 26  +MIN, improved    Patient sitting in chair, more awake and alert today    RECOMMENDATIONS:   - con't with antibiotics as per ID recommendations.    - con't with bronchodilators, o2 supplement as needed.    - aspiration precautions   - DVT and GI prophylaxis.   Agree with above assessment and plan as transcribed.

## 2018-05-16 NOTE — PROGRESS NOTE ADULT - PROBLEM SELECTOR PLAN 3
Continue Abx.
patient takes Telmisartan 40 mg daily   hold for now till sepsis resolves
Continue Abx.
patient takes Telmisartan 40 mg daily   hold for now till sepsis resolves
patient takes Telmisartan 40 mg daily   hold for now till sepsis resolves

## 2018-05-16 NOTE — PROGRESS NOTE ADULT - ASSESSMENT
89y F with MIN in the setting of sepsis/UTI with hypotension, also s/p IV contrast and possible contrast-induced nephropathy.  Kidney cyst of no consequence given the benign appearance and age of patient.

## 2018-05-16 NOTE — PROGRESS NOTE ADULT - ATTENDING COMMENTS
I agree with the above information  changes made above as needed  of note, pt's clinical status improving  Bacterial PNA, UTI  on zosyn ivpb abx   urine cultures appreciated  upon d/c will adjust to po levaquin   alejo stable  d/c trejo cath, tov  pt, case management  d/c planning

## 2018-05-16 NOTE — PROGRESS NOTE ADULT - ASSESSMENT
89 year old female from lives with her daughter, needs 24 hr Assistance with  ADL's, HHA 12 hrs. Past medical H/o of dementia, HTN, TIA, HLD brought to ED  with C/o chills and fever since 1 day. Patient is minimall verbal, [place], History was given by her daughter. Patient was in her usual health, but this evening she was having chills, and tempt of 102 F at home along with fatigue and weaknes, sob, c.p, one episode of Vomiting and complains of left lower quadrant pain, she has bowel movement yesterday, no diarrhea or constipation.   Denies any headaches, neck pain or rigidity, SOB, cough, chest pain, dysuria or leg pains. Patient denies any sick contacts, flu like symptoms or recent travel.   In ED patient was febrile Tmax 102.6, vitals HR 92, /77, RR 16 and O2 sat 94 at room air, Leucocytosis, EKG NSR no ST t wave changes.   Patient is admitted to medicine floor for Sepsis , likely from Pneumonia.

## 2018-05-16 NOTE — PROGRESS NOTE ADULT - SUBJECTIVE AND OBJECTIVE BOX
Patient is seen and examined at the bed side, is afebrile. She is doing better, remains alert.  The WBC count stay normal.       REVIEW OF SYSTEMS: All other review systems are negative        Vital Signs Last 24 Hrs  T(C): 36.7 (16 May 2018 16:01), Max: 36.7 (15 May 2018 23:50)  T(F): 98 (16 May 2018 16:01), Max: 98.1 (15 May 2018 23:50)  HR: 88 (16 May 2018 16:01) (83 - 108)  BP: 134/86 (16 May 2018 16:01) (110/57 - 134/86)  BP(mean): --  RR: 16 (16 May 2018 16:01) (16 - 17)  SpO2: 97% (16 May 2018 16:) (95% - 97%)          PHYSICAL EXAM:  GENERAL: Not in distress  CVS: s1  and s2 present  RESP: Air entry B/L  GI: Abdomen nondistended and nontender  EXT: No pedal edema  CNS: Awake, Alert and demented         ALLERGIES: NKDA          LABS:                        8.9    6.7   )-----------( 164      ( 16 May 2018 07:00 )             29.0                           9.4    9.9   )-----------( 148      ( 15 May 2018 07:20 )             29.4                           9.6    20.0  )-----------( 140      ( 14 May 2018 06:34 )             30.3                  05-16    141  |  111<H>  |  12  ----------------------------<  89  3.7   |  25  |  1.47<H>    Ca    8.2<L>      16 May 2018 07:00  Phos  3.4     05-16  Mg     2.0     05-16      05-15    140  |  111<H>  |  14  ----------------------------<  86  4.0   |  22  |  1.44<H>    Ca    8.1<L>      15 May 2018 07:20  Phos  2.9     05-15  Mg     2.1     05-15    TPro  6.0  /  Alb  2.8<L>  /  TBili  0.5  /  DBili  x   /  AST  28  /  ALT  12  /  AlkPhos  62  05-14         PTT - ( 12 May 2018 20:23 )  PTT:27.4 sec  Urinalysis Basic - ( 12 May 2018 21:09 )    Color: Yellow / Appearance: Clear / S.010 / pH: x  Gluc: x / Ketone: Negative  / Bili: Negative / Urobili: Negative   Blood: x / Protein: Negative / Nitrite: Positive   Leuk Esterase: Small / RBC: 2-5 /HPF / WBC 3-5 /HPF   Sq Epi: x / Non Sq Epi: Occasional /HPF / Bacteria: TNTC /HPF        MEDICATIONS  (STANDING):  ALBUTerol/ipratropium for Nebulization 3 milliLiter(s) Nebulizer every 6 hours  docusate sodium 100 milliGRAM(s) Oral two times a day  heparin  Injectable 5000 Unit(s) SubCutaneous every 12 hours  multivitamin/minerals 1 Tablet(s) Oral daily  pantoprazole    Tablet 40 milliGRAM(s) Oral before breakfast  piperacillin/tazobactam IVPB. 3.375 Gram(s) IV Intermittent every 8 hours  polyethylene glycol 3350 17 Gram(s) Oral daily  senna 2 Tablet(s) Oral at bedtime  simvastatin 10 milliGRAM(s) Oral at bedtime    MEDICATIONS  (PRN):  acetaminophen   Tablet 650 milliGRAM(s) Oral every 6 hours PRN For Temp greater than 38 C (100.4 F)  acetaminophen   Tablet. 650 milliGRAM(s) Oral every 6 hours PRN Mild Pain (1 - 3)          RADIOLOGY & ADDITIONAL TESTS:    18: CT Abdomen and Pelvis w/ Oral Cont and w/ IV Cont (18 @ 04:25) Possible gastritis. Clinical correlation is suggested.      18: Xray Chest 1 View AP/PA (18 @ 20:07) Study limited due to rotation. Overlying chin obscures right lung apex. No gross consolidation or pleural effusion. Chronic rib deformities. Chronic right clavicle deformity.          MICROBIOLOGY DATA:    Culture - Urine (18 @ 12:24)    -  Amikacin: S <=8    -  Amoxicillin/Clavulanic Acid: S <=8/4    -  Ampicillin: S 4 These ampicillin results predict results for amoxicillin    -  Ampicillin/Sulbactam: S <=4/2    -  Aztreonam: S <=4    -  Cefazolin: S <=2 This predicts results for oral agents cefaclor, cefdinir, cefpodoxime, cefprozil, cefuroxime axetil, cephalexin and locarbef for uncomplicated UTI. Note that some isolates may be susceptible to these agents while testing resistant to cefazolin.    -  Cefepime: S <=2    -  Cefoxitin: S <=4    -  Ceftriaxone: S <=1 Enterobacter, Citrobacter, and Serratia may develop resistance during prolonged therapy    -  Ciprofloxacin: S <=0.5    -  Ertapenem: S <=0.5    -  Gentamicin: S <=1    -  Imipenem: S <=1    -  Levofloxacin: S <=1    -  Meropenem: S <=1    -  Nitrofurantoin: S <=32 Should not be used to treat pyelonephritis    -  Piperacillin/Tazobactam: S <=8    -  Tigecycline: S <=1    -  Tobramycin: S <=2    -  Trimethoprim/Sulfamethoxazole: S <=0.5/9.5    Specimen Source: .Urine Catheterized    Culture Results:   >100,000 CFU/ml Escherichia coli    Organism Identification: Escherichia coli    Organism: Escherichia coli    Method Type: [facility]      Rapid Respiratory Viral Panel (18 @ 23:42)    Rapid RVP Result: NotDetec: The FilmArray RVP Rapid uses polymerase chain reaction (PCR) and melt  curve analysis to screen for adenovirus; coronavirus HKU1, NL63, 229E,  OC43; human metapneumovirus (hMPV); human enterovirus/rhinovirus  (Entero/RV); influenza A; influenza A/H1;influenza A/H3; influenza  A/H1-2009; influenza B; parainfluenza viruses 1, 2, 3, 4; respiratory  syncytial virus; Bordetella pertussis; Mycoplasma pneumoniae; and  Chlamydophila pneumoniae.    Culture - Blood (18 @ 23:13)    Specimen Source: .Blood Blood-Peripheral    Culture Results:   No growth to date.    Culture - Blood (18 @ 23:13)    Specimen Source: .Blood Blood-Peripheral    Culture Results:   No growth to date.        Rapid Respiratory Viral Panel (18 @ 23:42)    Rapid RVP Result: NotDetec: The FilmArray RVP Rapid uses polymerase chain reaction (PCR) and melt  curve analysis to screen for adenovirus; coronavirus HKU1, NL63, 229E,  OC43; human metapneumovirus (hMPV); human enterovirus/rhinovirus  (Entero/RV); influenza A; influenza A/H1;influenza A/H3; influenza  A/H1-2009; influenza B; parainfluenza viruses 1, 2, 3, 4; respiratory  syncytial virus; Bordetella pertussis; Mycoplasma pneumoniae; and  Chlamydophila pneumoniae.

## 2018-05-16 NOTE — PROGRESS NOTE ADULT - PROBLEM SELECTOR PROBLEM 2
PNA (pneumonia)
Sepsis, due to unspecified organism
Sepsis, due to unspecified organism

## 2018-05-17 ENCOUNTER — TRANSCRIPTION ENCOUNTER (OUTPATIENT)
Age: 83
End: 2018-05-17

## 2018-05-17 VITALS — DIASTOLIC BLOOD PRESSURE: 55 MMHG | SYSTOLIC BLOOD PRESSURE: 125 MMHG | HEART RATE: 73 BPM

## 2018-05-17 LAB
ANION GAP SERPL CALC-SCNC: 10 MMOL/L — SIGNIFICANT CHANGE UP (ref 5–17)
BUN SERPL-MCNC: 10 MG/DL — SIGNIFICANT CHANGE UP (ref 7–18)
CALCIUM SERPL-MCNC: 8.5 MG/DL — SIGNIFICANT CHANGE UP (ref 8.4–10.5)
CHLORIDE SERPL-SCNC: 113 MMOL/L — HIGH (ref 96–108)
CO2 SERPL-SCNC: 22 MMOL/L — SIGNIFICANT CHANGE UP (ref 22–31)
CREAT SERPL-MCNC: 1.32 MG/DL — HIGH (ref 0.5–1.3)
GLUCOSE SERPL-MCNC: 88 MG/DL — SIGNIFICANT CHANGE UP (ref 70–99)
POTASSIUM SERPL-MCNC: 3.6 MMOL/L — SIGNIFICANT CHANGE UP (ref 3.5–5.3)
POTASSIUM SERPL-SCNC: 3.6 MMOL/L — SIGNIFICANT CHANGE UP (ref 3.5–5.3)
SODIUM SERPL-SCNC: 145 MMOL/L — SIGNIFICANT CHANGE UP (ref 135–145)

## 2018-05-17 RX ORDER — CIPROFLOXACIN LACTATE 400MG/40ML
1 VIAL (ML) INTRAVENOUS
Qty: 3 | Refills: 0 | OUTPATIENT
Start: 2018-05-17 | End: 2018-05-19

## 2018-05-17 RX ORDER — DOCUSATE SODIUM 100 MG
1 CAPSULE ORAL
Qty: 60 | Refills: 0 | OUTPATIENT
Start: 2018-05-17 | End: 2018-06-15

## 2018-05-17 RX ORDER — SENNA PLUS 8.6 MG/1
2 TABLET ORAL
Qty: 60 | Refills: 0 | OUTPATIENT
Start: 2018-05-17 | End: 2018-06-15

## 2018-05-17 RX ADMIN — SODIUM CHLORIDE 60 MILLILITER(S): 9 INJECTION INTRAMUSCULAR; INTRAVENOUS; SUBCUTANEOUS at 05:14

## 2018-05-17 RX ADMIN — Medication 3 MILLILITER(S): at 15:21

## 2018-05-17 RX ADMIN — Medication 3 MILLILITER(S): at 08:39

## 2018-05-17 RX ADMIN — HEPARIN SODIUM 5000 UNIT(S): 5000 INJECTION INTRAVENOUS; SUBCUTANEOUS at 05:12

## 2018-05-17 RX ADMIN — PANTOPRAZOLE SODIUM 40 MILLIGRAM(S): 20 TABLET, DELAYED RELEASE ORAL at 05:13

## 2018-05-17 RX ADMIN — Medication 100 MILLIGRAM(S): at 05:13

## 2018-05-17 RX ADMIN — PIPERACILLIN AND TAZOBACTAM 25 GRAM(S): 4; .5 INJECTION, POWDER, LYOPHILIZED, FOR SOLUTION INTRAVENOUS at 05:13

## 2018-05-17 NOTE — PROGRESS NOTE ADULT - SUBJECTIVE AND OBJECTIVE BOX
Patient seen and examined at bedside  Medical charts and case reviewed  Consultants notes reviewed  Case discussed with medical team    Patient is medically stable and optimized for safe hospital discharge.   All questions and concerns have been appropriately answered and addressed.    Patient understands and complies to follow up with her primary medical doctor and pertinent medical specialists within 7 days of hospital discharge.

## 2018-05-17 NOTE — PHYSICAL THERAPY INITIAL EVALUATION ADULT - LEVEL OF INDEPENDENCE: STAIR NEGOTIATION, REHAB EVAL
n/a utiltize transport evac chair outdoors and stays most times in second floor home with HA/daughter

## 2018-05-17 NOTE — DISCHARGE NOTE ADULT - MEDICATION SUMMARY - MEDICATIONS TO TAKE
I will START or STAY ON the medications listed below when I get home from the hospital:    Micardis 40 mg oral tablet  -- 1 tab(s) by mouth once a day  -- Indication: For HTN (hypertension)    Zetia 10 mg oral tablet  -- 1 tab(s) by mouth once a day  -- Indication: For HLD    Plavix 75 mg oral tablet  -- 1 tab(s) by mouth once a day  -- Indication: For Cardioprotective     Combivent Respimat 20 mcg-100 mcg/inh inhalation aerosol  -- 2 puff(s) inhaled 4 times a day  -- Indication: For Asthma    Linzess 72 mcg oral capsule  -- 1 cap(s) by mouth once a day  -- Indication: For IBS    docusate sodium 100 mg oral capsule  -- 1 cap(s) by mouth 2 times a day  -- Indication: For Cosntipation    senna oral tablet  -- 2 tab(s) by mouth once a day (at bedtime)  -- Indication: For Constipation     magnesium oxide 400 mg (241.3 mg elemental magnesium) oral tablet  -- 1 tab(s) by mouth once a day  -- Indication: For Constipation     Levaquin 500 mg oral tablet  -- 1 tab(s) by mouth once a day   -- Avoid prolonged or excessive exposure to direct and/or artificial sunlight while taking this medication.  Do not take dairy products, antacids, or iron preparations within one hour of this medication.  Finish all this medication unless otherwise directed by prescriber.  May cause drowsiness or dizziness.  Medication should be taken with plenty of water.    -- Indication: For UTI

## 2018-05-17 NOTE — PROGRESS NOTE ADULT - PROVIDER SPECIALTY LIST ADULT
Cardiology
Infectious Disease
Internal Medicine
Nephrology
Pulmonology
Internal Medicine

## 2018-05-17 NOTE — DISCHARGE NOTE ADULT - HOSPITAL COURSE
HPI and Ed Course :  89 year old female from lives with her daughter, needs 24 hr Assistance with  ADL's, HHA 12 hrs. Past medical H/o of dementia, HTN, TIA, HLD brought to ED  with C/o chills and fever since 1 day. Patient is minimall verbal, [place], History was given by her daughter. Patient was in her usual health, but this evening she was having chills, and tempt of 102 F at home along with fatigue and weaknes, and sob. Patient has one episode of Vomiting and complains of left lower quadrant pain, she has bowel movement yesterday, no diarrhea or constipation.   Denies any headaches, neck pain or rigidity, cough, chest pain, dysuria or leg pains. Patient denies any sick contacts, flu like symptoms or recent travel.     Allergy NKDA  Social Hx , Non smoker, No ETOH or IVDA   Family Hx No significant family hx     In ED patient was febrile Tmax 102.6, vitals HR 92, /77, RR 16 and O2 sat 94 at room air, Leucocytosis, EKG NSR no ST t wave changes.    Patient is admitted to medicine for Sepsis secondary to UTI and Pneumonia.  Possible Right LL infiltrate on CXR and was being treated with Zosyn.  Blood cultures were negative.  Legionella and RVP was ruled out.  Patient was monitored for fever , leucocytosis that improved over time.  Patient had UA+ , being treated with Zosyn.  Urine cultures were positive for E. coli , sensitive to Rocephin.  Patient had acute urine retention on admission with retention of 400 cc on bladder scan.  Ct scan did not show any hydronephrosis.  Benign renal cyst was seen , not requiring any further workup.  Herrera was inserted and IV fluids were continued. MIN was presumed to be from sepsis.  Constipation , possibly being underlying cause of urine retention was treated.  Herrera was then removed , TOV given , patient passed.  Creatinine showed an improving trend.    Patient was continued on home medications for all other co-morbidities.   Daughter is HCP and patient is DNR/ DNI.     After stabilization , decision was made to discharge patient.

## 2018-05-17 NOTE — PHYSICAL THERAPY INITIAL EVALUATION ADULT - GENERAL OBSERVATIONS, REHAB EVAL
pt is a 89 y/o female, daughter bedside interpret visit, tolerating PT age appropriate functional assessment

## 2018-05-17 NOTE — DISCHARGE NOTE ADULT - CARE PLAN
Principal Discharge DX:	Sepsis  Goal:	Resolution of infection  Assessment and plan of treatment:	You came in with fever , hypotension and cough. You were admitted to medicine for Sepsis secondary to UTI and Pneumonia. Possible Right LL infiltrate on CXR and was being treated with Zosyn. Blood cultures were negative. Legionella and RVP was ruled out.  Patient was monitored for fever , leucocytosis that improved over time.  Patient had UA+ , being treated with Zosyn.  Urine cultures were positive for E. coli , sensitive to Rocephin.  Please continue with Ceftin po on discharge to complete the course of antibiotics.  Secondary Diagnosis:	MIN (acute kidney injury)  Assessment and plan of treatment:	Patient had acute urine retention on admission with retention of 400 cc on bladder scan.  Ct scan did not show any hydronephrosis.  Benign renal cyst was seen , not requiring any further workup.  Herrera was inserted and IV fluids were continued. MIN was presumed to be from sepsis.  Constipation , possibly being underlying cause of urine retention was treated.  Herrera was then removed , TOV given , patient passed.  Creatinine showed an improving trend.  Please get a repeat blood work in 1 week of discharge with PMD to monitor creatinine and consider Renal sono or urology outpatient if worsening noted.  Creatinine on dc : 1.3  Secondary Diagnosis:	HTN (hypertension)  Assessment and plan of treatment:	Your blood pressure was well-controlled during your admission. Continue with your current regimen of anti-hypertensive medications as mentioned in your discharge summary and ensure that you follow up with your primary care provider within 1 month of discharge for further recommendations and monitoring.  Secondary Diagnosis:	Hypercholesterolemia  Assessment and plan of treatment:	Continue with home medication.  Secondary Diagnosis:	Kidney cyst, acquired  Assessment and plan of treatment:	4.5 cm cyst of the lower pole of the right kidney. Small   intraparenchymal cyst is seen in the left kidney.  No further work up warranted. Principal Discharge DX:	Sepsis  Goal:	Resolution of infection  Assessment and plan of treatment:	You came in with fever , hypotension and cough. You were admitted to medicine for Sepsis secondary to UTI and Pneumonia. Possible Right LL infiltrate on CXR and was being treated with Zosyn. Blood cultures were negative. Legionella and RVP was ruled out.  Patient was monitored for fever , leucocytosis that improved over time.  Patient had UA+ , being treated with Zosyn.  Urine cultures were positive for E. coli , sensitive to Rocephin.  Please continue with Levaquin po on discharge to complete the course of antibiotics.  Please follow up with PMD in 1 week of discharge.  Secondary Diagnosis:	MIN (acute kidney injury)  Assessment and plan of treatment:	Patient had acute urine retention on admission with retention of 400 cc on bladder scan.  Ct scan did not show any hydronephrosis.  Benign renal cyst was seen , not requiring any further workup.  Herrera was inserted and IV fluids were continued. MIN was presumed to be from sepsis.  Constipation , possibly being underlying cause of urine retention was treated.  Herrera was then removed , TOV given , patient passed.  Creatinine showed an improving trend.  Please get a repeat blood work in 1 week of discharge with PMD to monitor creatinine and consider Renal sono or urology outpatient if worsening noted.  Creatinine on dc : 1.3  Secondary Diagnosis:	HTN (hypertension)  Assessment and plan of treatment:	Your blood pressure was well-controlled during your admission. Continue with your current regimen of anti-hypertensive medications as mentioned in your discharge summary and ensure that you follow up with your primary care provider within 1 month of discharge for further recommendations and monitoring.  Secondary Diagnosis:	Hypercholesterolemia  Assessment and plan of treatment:	Continue with home medication.  Secondary Diagnosis:	Kidney cyst, acquired  Assessment and plan of treatment:	4.5 cm cyst of the lower pole of the right kidney. Small   intraparenchymal cyst is seen in the left kidney.  No further work up warranted.

## 2018-05-17 NOTE — DISCHARGE NOTE ADULT - PLAN OF CARE
Your blood pressure was well-controlled during your admission. Continue with your current regimen of anti-hypertensive medications as mentioned in your discharge summary and ensure that you follow up with your primary care provider within 1 month of discharge for further recommendations and monitoring. Continue with home medication. 4.5 cm cyst of the lower pole of the right kidney. Small   intraparenchymal cyst is seen in the left kidney.  No further work up warranted. Resolution of infection You came in with fever , hypotension and cough. You were admitted to medicine for Sepsis secondary to UTI and Pneumonia. Possible Right LL infiltrate on CXR and was being treated with Zosyn. Blood cultures were negative. Legionella and RVP was ruled out.  Patient was monitored for fever , leucocytosis that improved over time.  Patient had UA+ , being treated with Zosyn.  Urine cultures were positive for E. coli , sensitive to Rocephin.  Please continue with Ceftin po on discharge to complete the course of antibiotics. Patient had acute urine retention on admission with retention of 400 cc on bladder scan.  Ct scan did not show any hydronephrosis.  Benign renal cyst was seen , not requiring any further workup.  Herrera was inserted and IV fluids were continued. MIN was presumed to be from sepsis.  Constipation , possibly being underlying cause of urine retention was treated.  Herrera was then removed , TOV given , patient passed.  Creatinine showed an improving trend.  Please get a repeat blood work in 1 week of discharge with PMD to monitor creatinine and consider Renal sono or urology outpatient if worsening noted.  Creatinine on dc : 1.3 You came in with fever , hypotension and cough. You were admitted to medicine for Sepsis secondary to UTI and Pneumonia. Possible Right LL infiltrate on CXR and was being treated with Zosyn. Blood cultures were negative. Legionella and RVP was ruled out.  Patient was monitored for fever , leucocytosis that improved over time.  Patient had UA+ , being treated with Zosyn.  Urine cultures were positive for E. coli , sensitive to Rocephin.  Please continue with Levaquin po on discharge to complete the course of antibiotics.  Please follow up with PMD in 1 week of discharge.

## 2018-05-17 NOTE — PROGRESS NOTE ADULT - SUBJECTIVE AND OBJECTIVE BOX
Time of Visit:  Patient seen and examined.     MEDICATIONS  (STANDING):  ALBUTerol/ipratropium for Nebulization 3 milliLiter(s) Nebulizer every 6 hours  docusate sodium 100 milliGRAM(s) Oral two times a day  heparin  Injectable 5000 Unit(s) SubCutaneous every 12 hours  multivitamin/minerals 1 Tablet(s) Oral daily  pantoprazole    Tablet 40 milliGRAM(s) Oral before breakfast  piperacillin/tazobactam IVPB. 3.375 Gram(s) IV Intermittent every 8 hours  polyethylene glycol 3350 17 Gram(s) Oral daily  senna 2 Tablet(s) Oral at bedtime  simvastatin 10 milliGRAM(s) Oral at bedtime  sodium chloride 0.9%. 1000 milliLiter(s) (60 mL/Hr) IV Continuous <Continuous>      MEDICATIONS  (PRN):  acetaminophen   Tablet 650 milliGRAM(s) Oral every 6 hours PRN For Temp greater than 38 C (100.4 F)  acetaminophen   Tablet. 650 milliGRAM(s) Oral every 6 hours PRN Mild Pain (1 - 3)  melatonin 3 milliGRAM(s) Oral at bedtime PRN Insomnia       Medications up to date at time of exam.    ROS; No fever, chills, cough, congestion, SOB.  PHYSICAL EXAMINATION:       Vital Signs Last 24 Hrs  T(C): 36.4 (17 May 2018 08:14), Max: 36.7 (16 May 2018 16:01)  T(F): 97.6 (17 May 2018 08:14), Max: 98 (16 May 2018 16:01)  HR: 73 (17 May 2018 12:58) (73 - 89)  BP: 125/55 (17 May 2018 12:58) (122/71 - 134/86)  BP(mean): --  RR: 17 (17 May 2018 08:14) (16 - 17)  SpO2: 99% (17 May 2018 08:14) (92% - 99%)   (if applicable)  General: Alert and oriented. No acute distress.     HEENT: Normocephalic and atraumatic. No nasal tenderness.  Moist mucosa. [place].    NECK: Supple, no palpable adenopathy.    LUNGS: Clear to auscultation, no wheezing, rales, or rhonchi. No use of accessory muscle.      HEART: S1 S2 Regular rate and no click/ rub.     ABDOMEN: Soft, nontender, and nondistended.  No hepatosplenomegaly is noted. Active bowel sounds.     EXTREMITIES: Without any cyanosis, clubbing, rash, lesions or edema.    NEUROLOGIC: Awake, alert, oriented.     SKIN: Warm and moist. Non diaphoretic         LABS:                        8.9    6.7   )-----------( 164      ( 16 May 2018 07:00 )             29.0     05-17    145  |  113<H>  |  10  ----------------------------<  88  3.6   |  22  |  1.32<H>    Ca    8.5      17 May 2018 07:30  Phos  3.4     05-16  Mg     2.0     05-16    RADIOLOGY & ADDITIONAL STUDIES:  EKG:   CXR: < from: Xray Chest 1 View AP/PA (05.12.18 @ 20:07) >  INTERPRETATION:  CLINICAL STATEMENT: Chest Pain.    TECHNIQUE: AP view of the chest.    COMPARISON: None available.    FINDINGS/  IMPRESSION:  Study limiteddue to rotation. Overlying chin obscures right lung apex.    No gross consolidation or pleural effusion.    Chronic rib deformities. Chronic right clavicle deformity.    Nonspecific small nodular density overlies right upper lung zone.   Nonemergent CT chest can be obtained. If clinically warranted    Heart size cannot be accurately assessed in this projection.    PAST MEDICAL & SURGICAL HISTORY:  TIA (transient ischemic attack)  Hypercholesterolemia  HTN (hypertension)  No significant past surgical history       Impression; 89 Y/O Female with prior mentioned multiple chronic conditions. Brought to ED with c/o chills, fever, SOB due to UTI. RVP negative, Afebrile. Had Leukocytosis , now improved WBC 6.7. + UTI e.coli    Suggestions:    Continue DuoNeb Q 6 hours.  Oxygen supplementation to keep o2 saturation >90%.  Continue antibiotic per ID recommendation.  Aspiration precautions   DVT and GI prophylaxis. Time of Visit:  Patient seen and examined.     MEDICATIONS  (STANDING):  ALBUTerol/ipratropium for Nebulization 3 milliLiter(s) Nebulizer every 6 hours  docusate sodium 100 milliGRAM(s) Oral two times a day  heparin  Injectable 5000 Unit(s) SubCutaneous every 12 hours  multivitamin/minerals 1 Tablet(s) Oral daily  pantoprazole    Tablet 40 milliGRAM(s) Oral before breakfast  piperacillin/tazobactam IVPB. 3.375 Gram(s) IV Intermittent every 8 hours  polyethylene glycol 3350 17 Gram(s) Oral daily  senna 2 Tablet(s) Oral at bedtime  simvastatin 10 milliGRAM(s) Oral at bedtime  sodium chloride 0.9%. 1000 milliLiter(s) (60 mL/Hr) IV Continuous <Continuous>      MEDICATIONS  (PRN):  acetaminophen   Tablet 650 milliGRAM(s) Oral every 6 hours PRN For Temp greater than 38 C (100.4 F)  acetaminophen   Tablet. 650 milliGRAM(s) Oral every 6 hours PRN Mild Pain (1 - 3)  melatonin 3 milliGRAM(s) Oral at bedtime PRN Insomnia       Medications up to date at time of exam.    ROS; No fever, chills, cough, congestion, SOB.  PHYSICAL EXAMINATION:       Vital Signs Last 24 Hrs  T(C): 36.4 (17 May 2018 08:14), Max: 36.7 (16 May 2018 16:01)  T(F): 97.6 (17 May 2018 08:14), Max: 98 (16 May 2018 16:01)  HR: 73 (17 May 2018 12:58) (73 - 89)  BP: 125/55 (17 May 2018 12:58) (122/71 - 134/86)  BP(mean): --  RR: 17 (17 May 2018 08:14) (16 - 17)  SpO2: 99% (17 May 2018 08:14) (92% - 99%)   (if applicable)  General: Alert and oriented. No acute distress.     HEENT: Normocephalic and atraumatic. No nasal tenderness.  Moist mucosa. [place].    NECK: Supple, no palpable adenopathy.    LUNGS: Clear to auscultation, no wheezing, rales, or rhonchi. No use of accessory muscle.      HEART: S1 S2 Regular rate and no click/ rub.     ABDOMEN: Soft, nontender, and nondistended.  No hepatosplenomegaly is noted. Active bowel sounds.     EXTREMITIES: Without any cyanosis, clubbing, rash, lesions or edema.    NEUROLOGIC: Awake, alert, oriented.     SKIN: Warm and moist. Non diaphoretic         LABS:                        8.9    6.7   )-----------( 164      ( 16 May 2018 07:00 )             29.0     05-17    145  |  113<H>  |  10  ----------------------------<  88  3.6   |  22  |  1.32<H>    Ca    8.5      17 May 2018 07:30  Phos  3.4     05-16  Mg     2.0     05-16    RADIOLOGY & ADDITIONAL STUDIES:  EKG:   CXR: < from: Xray Chest 1 View AP/PA (05.12.18 @ 20:07) >  INTERPRETATION:  CLINICAL STATEMENT: Chest Pain.    TECHNIQUE: AP view of the chest.    COMPARISON: None available.    FINDINGS/  IMPRESSION:  Study limiteddue to rotation. Overlying chin obscures right lung apex.    No gross consolidation or pleural effusion.    Chronic rib deformities. Chronic right clavicle deformity.    Nonspecific small nodular density overlies right upper lung zone.   Nonemergent CT chest can be obtained. If clinically warranted    Heart size cannot be accurately assessed in this projection.    PAST MEDICAL & SURGICAL HISTORY:  TIA (transient ischemic attack)  Hypercholesterolemia  HTN (hypertension)  No significant past surgical history       Impression; 89 Y/O Female with prior mentioned multiple chronic conditions. Brought to ED with c/o chills, fever, SOB due to UTI. RVP negative, Afebrile. Had Leukocytosis , now improved WBC 6.7. + UTI e.coli    Suggestions:    Continue DuoNeb Q 6 hours.  Oxygen supplementation to keep o2 saturation >90%.  Continue antibiotic per ID recommendation.  Aspiration precautions   DVT and GI prophylaxis.       Agree with above assessment and plan as transcribed.

## 2018-05-17 NOTE — DISCHARGE NOTE ADULT - PATIENT PORTAL LINK FT
You can access the [facility] Patient Portal, offered by [facility], by registering with the following website: [email or web address]

## 2018-05-18 LAB
CULTURE RESULTS: SIGNIFICANT CHANGE UP
CULTURE RESULTS: SIGNIFICANT CHANGE UP
SPECIMEN SOURCE: SIGNIFICANT CHANGE UP
SPECIMEN SOURCE: SIGNIFICANT CHANGE UP

## 2018-05-23 PROCEDURE — 83880 ASSAY OF NATRIURETIC PEPTIDE: CPT

## 2018-05-23 PROCEDURE — 80061 LIPID PANEL: CPT

## 2018-05-23 PROCEDURE — 87040 BLOOD CULTURE FOR BACTERIA: CPT

## 2018-05-23 PROCEDURE — 87899 AGENT NOS ASSAY W/OPTIC: CPT

## 2018-05-23 PROCEDURE — 85730 THROMBOPLASTIN TIME PARTIAL: CPT

## 2018-05-23 PROCEDURE — 81001 URINALYSIS AUTO W/SCOPE: CPT

## 2018-05-23 PROCEDURE — 87798 DETECT AGENT NOS DNA AMP: CPT

## 2018-05-23 PROCEDURE — 94640 AIRWAY INHALATION TREATMENT: CPT

## 2018-05-23 PROCEDURE — 83036 HEMOGLOBIN GLYCOSYLATED A1C: CPT

## 2018-05-23 PROCEDURE — 87581 M.PNEUMON DNA AMP PROBE: CPT

## 2018-05-23 PROCEDURE — 99285 EMERGENCY DEPT VISIT HI MDM: CPT | Mod: 25

## 2018-05-23 PROCEDURE — 80053 COMPREHEN METABOLIC PANEL: CPT

## 2018-05-23 PROCEDURE — 83935 ASSAY OF URINE OSMOLALITY: CPT

## 2018-05-23 PROCEDURE — 82009 KETONE BODYS QUAL: CPT

## 2018-05-23 PROCEDURE — 87186 SC STD MICRODIL/AGAR DIL: CPT

## 2018-05-23 PROCEDURE — 87633 RESP VIRUS 12-25 TARGETS: CPT

## 2018-05-23 PROCEDURE — 83735 ASSAY OF MAGNESIUM: CPT

## 2018-05-23 PROCEDURE — 84300 ASSAY OF URINE SODIUM: CPT

## 2018-05-23 PROCEDURE — 84145 PROCALCITONIN (PCT): CPT

## 2018-05-23 PROCEDURE — 84100 ASSAY OF PHOSPHORUS: CPT

## 2018-05-23 PROCEDURE — 83605 ASSAY OF LACTIC ACID: CPT

## 2018-05-23 PROCEDURE — 70450 CT HEAD/BRAIN W/O DYE: CPT

## 2018-05-23 PROCEDURE — 87086 URINE CULTURE/COLONY COUNT: CPT

## 2018-05-23 PROCEDURE — 96375 TX/PRO/DX INJ NEW DRUG ADDON: CPT

## 2018-05-23 PROCEDURE — 74177 CT ABD & PELVIS W/CONTRAST: CPT

## 2018-05-23 PROCEDURE — 82306 VITAMIN D 25 HYDROXY: CPT

## 2018-05-23 PROCEDURE — 82570 ASSAY OF URINE CREATININE: CPT

## 2018-05-23 PROCEDURE — 80048 BASIC METABOLIC PNL TOTAL CA: CPT

## 2018-05-23 PROCEDURE — 87486 CHLMYD PNEUM DNA AMP PROBE: CPT

## 2018-05-23 PROCEDURE — 97161 PT EVAL LOW COMPLEX 20 MIN: CPT

## 2018-05-23 PROCEDURE — 84156 ASSAY OF PROTEIN URINE: CPT

## 2018-05-23 PROCEDURE — 87449 NOS EACH ORGANISM AG IA: CPT

## 2018-05-23 PROCEDURE — 85027 COMPLETE CBC AUTOMATED: CPT

## 2018-05-23 PROCEDURE — 93005 ELECTROCARDIOGRAM TRACING: CPT

## 2018-05-23 PROCEDURE — 71045 X-RAY EXAM CHEST 1 VIEW: CPT

## 2018-05-23 PROCEDURE — 96374 THER/PROPH/DIAG INJ IV PUSH: CPT | Mod: XU

## 2018-05-23 PROCEDURE — 84443 ASSAY THYROID STIM HORMONE: CPT

## 2018-05-23 PROCEDURE — 85610 PROTHROMBIN TIME: CPT

## 2019-04-23 ENCOUNTER — INPATIENT (INPATIENT)
Facility: HOSPITAL | Age: 84
LOS: 2 days | Discharge: HOME | DRG: 682 | End: 2019-04-26
Attending: HOSPITALIST | Admitting: HOSPITALIST
Payer: MEDICARE

## 2019-04-23 VITALS
HEIGHT: 58 IN | RESPIRATION RATE: 18 BRPM | SYSTOLIC BLOOD PRESSURE: 96 MMHG | HEART RATE: 105 BPM | OXYGEN SATURATION: 100 % | WEIGHT: 89.95 LBS | TEMPERATURE: 98 F | DIASTOLIC BLOOD PRESSURE: 55 MMHG

## 2019-04-23 DIAGNOSIS — R53.1 WEAKNESS: ICD-10-CM

## 2019-04-23 LAB
ALBUMIN SERPL ELPH-MCNC: 2.9 G/DL — LOW (ref 3.5–5)
ALP SERPL-CCNC: 67 U/L — SIGNIFICANT CHANGE UP (ref 40–120)
ALT FLD-CCNC: 9 U/L DA — LOW (ref 10–60)
ANION GAP SERPL CALC-SCNC: 9 MMOL/L — SIGNIFICANT CHANGE UP (ref 5–17)
APTT BLD: 22.1 SEC — LOW (ref 27.5–36.3)
AST SERPL-CCNC: 24 U/L — SIGNIFICANT CHANGE UP (ref 10–40)
BILIRUB SERPL-MCNC: 0.4 MG/DL — SIGNIFICANT CHANGE UP (ref 0.2–1.2)
BUN SERPL-MCNC: 34 MG/DL — HIGH (ref 7–18)
CALCIUM SERPL-MCNC: 8.2 MG/DL — LOW (ref 8.4–10.5)
CHLORIDE SERPL-SCNC: 109 MMOL/L — HIGH (ref 96–108)
CO2 SERPL-SCNC: 23 MMOL/L — SIGNIFICANT CHANGE UP (ref 22–31)
CREAT SERPL-MCNC: 2.91 MG/DL — HIGH (ref 0.5–1.3)
GLUCOSE SERPL-MCNC: 146 MG/DL — HIGH (ref 70–99)
HCT VFR BLD CALC: 40.4 % — SIGNIFICANT CHANGE UP (ref 34.5–45)
HGB BLD-MCNC: 13.1 G/DL — SIGNIFICANT CHANGE UP (ref 11.5–15.5)
LACTATE SERPL-SCNC: 1.4 MMOL/L — SIGNIFICANT CHANGE UP (ref 0.7–2)
MCHC RBC-ENTMCNC: 29.2 PG — SIGNIFICANT CHANGE UP (ref 27–34)
MCHC RBC-ENTMCNC: 32.4 GM/DL — SIGNIFICANT CHANGE UP (ref 32–36)
MCV RBC AUTO: 90 FL — SIGNIFICANT CHANGE UP (ref 80–100)
NRBC # BLD: 0 /100 WBCS — SIGNIFICANT CHANGE UP (ref 0–0)
PLATELET # BLD AUTO: 244 K/UL — SIGNIFICANT CHANGE UP (ref 150–400)
POTASSIUM SERPL-MCNC: 3.5 MMOL/L — SIGNIFICANT CHANGE UP (ref 3.5–5.3)
POTASSIUM SERPL-SCNC: 3.5 MMOL/L — SIGNIFICANT CHANGE UP (ref 3.5–5.3)
PROT SERPL-MCNC: 6.5 G/DL — SIGNIFICANT CHANGE UP (ref 6–8.3)
RBC # BLD: 4.49 M/UL — SIGNIFICANT CHANGE UP (ref 3.8–5.2)
RBC # FLD: 14.8 % — HIGH (ref 10.3–14.5)
SODIUM SERPL-SCNC: 141 MMOL/L — SIGNIFICANT CHANGE UP (ref 135–145)
TROPONIN I SERPL-MCNC: 0.83 NG/ML — HIGH (ref 0–0.04)
WBC # BLD: 8.52 K/UL — SIGNIFICANT CHANGE UP (ref 3.8–10.5)
WBC # FLD AUTO: 8.52 K/UL — SIGNIFICANT CHANGE UP (ref 3.8–10.5)

## 2019-04-23 PROCEDURE — 71045 X-RAY EXAM CHEST 1 VIEW: CPT | Mod: 26

## 2019-04-23 PROCEDURE — 99291 CRITICAL CARE FIRST HOUR: CPT

## 2019-04-23 PROCEDURE — 99223 1ST HOSP IP/OBS HIGH 75: CPT

## 2019-04-23 RX ORDER — DILTIAZEM HCL 120 MG
10 CAPSULE, EXT RELEASE 24 HR ORAL ONCE
Qty: 0 | Refills: 0 | Status: COMPLETED | OUTPATIENT
Start: 2019-04-23 | End: 2019-04-23

## 2019-04-23 RX ORDER — SODIUM CHLORIDE 9 MG/ML
500 INJECTION INTRAMUSCULAR; INTRAVENOUS; SUBCUTANEOUS ONCE
Qty: 0 | Refills: 0 | Status: COMPLETED | OUTPATIENT
Start: 2019-04-23 | End: 2019-04-23

## 2019-04-23 RX ORDER — CEFTRIAXONE 500 MG/1
1 INJECTION, POWDER, FOR SOLUTION INTRAMUSCULAR; INTRAVENOUS ONCE
Qty: 0 | Refills: 0 | Status: COMPLETED | OUTPATIENT
Start: 2019-04-23 | End: 2019-04-23

## 2019-04-23 RX ORDER — AZITHROMYCIN 500 MG/1
500 TABLET, FILM COATED ORAL ONCE
Qty: 0 | Refills: 0 | Status: COMPLETED | OUTPATIENT
Start: 2019-04-23 | End: 2019-04-23

## 2019-04-23 RX ADMIN — SODIUM CHLORIDE 500 MILLILITER(S): 9 INJECTION INTRAMUSCULAR; INTRAVENOUS; SUBCUTANEOUS at 22:54

## 2019-04-23 RX ADMIN — SODIUM CHLORIDE 1000 MILLILITER(S): 9 INJECTION INTRAMUSCULAR; INTRAVENOUS; SUBCUTANEOUS at 23:04

## 2019-04-23 RX ADMIN — SODIUM CHLORIDE 1000 MILLILITER(S): 9 INJECTION INTRAMUSCULAR; INTRAVENOUS; SUBCUTANEOUS at 22:05

## 2019-04-23 RX ADMIN — AZITHROMYCIN 250 MILLIGRAM(S): 500 TABLET, FILM COATED ORAL at 23:45

## 2019-04-23 RX ADMIN — Medication 10 MILLIGRAM(S): at 22:04

## 2019-04-23 RX ADMIN — CEFTRIAXONE 100 GRAM(S): 500 INJECTION, POWDER, FOR SOLUTION INTRAMUSCULAR; INTRAVENOUS at 23:45

## 2019-04-23 NOTE — ED ADULT NURSE NOTE - NSIMPLEMENTINTERV_GEN_ALL_ED
Implemented All Fall Risk Interventions:  [place] to call system. Call bell, personal items and telephone within reach. Instruct patient to call for assistance. Room bathroom lighting operational. Non-slip footwear when patient is off stretcher. Physically safe environment: no spills, clutter or unnecessary equipment. Stretcher in lowest position, wheels locked, appropriate side rails in place. Provide visual cue, wrist band, yellow gown, etc. Monitor gait and stability. Monitor for mental status changes and reorient to person, place, and time. Review medications for side effects contributing to fall risk. Reinforce activity limits and safety measures with patient and family.

## 2019-04-23 NOTE — ED PROVIDER NOTE - PHYSICAL EXAMINATION
CONSTITUTIONAL: Well appearing, well nourished, awake, alert and in no apparent distress.  HEENT: Head is atraumatic. Eyes clear bilaterally, normal EOMI. Airway patent.  CARDIAC: irregularly irregular, tachycardic, Heart sounds S1, S2.   RESPIRATORY: Breath sounds clear and equal bilaterally. no tachypnea, respiratory distress.   GASTROINTESTINAL: Abdomen soft, non-tender, no guarding, distension.  MUSCULOSKELETAL: Spine appears normal, no midline spinal tenderness, range of motion is not limited, no muscle or joint tenderness. no bony tenderness. no JVD, peripheral edema.   NEUROLOGICAL: Alert and oriented, no focal deficits, no motor or sensory deficits.  SKIN: Skin normal color for race, warm, dry and intact. No evidence of rash.

## 2019-04-23 NOTE — ED PROVIDER NOTE - CLINICAL SUMMARY MEDICAL DECISION MAKING FREE TEXT BOX
pt DNR/DNI w weakness for two days, noted w afib w RVR, rate controlled, treated for pna pt DNR/DNI w weakness for two days, noted w afib w RVR, rate controlled, treated for pna, iv fluid boluses administered for hypotension.

## 2019-04-23 NOTE — ED PROVIDER NOTE - CARE PLAN
Principal Discharge DX:	Weakness  Secondary Diagnosis:	Atrial fibrillation  Secondary Diagnosis:	Pneumonia

## 2019-04-23 NOTE — H&P ADULT - PROBLEM SELECTOR PLAN 1
New onset AFib with HR in 140's  Demand troponin  s/p Cardizem 10mg in ED  Ordered 1 dose of 150mg Amiodarone  Aggressive hydration   HAS-BLED score of 5, not a candidate of anticoagulation  F/u infectious work up to r/o sepsis New onset AFib with HR in 140's  Demand troponin  s/p Cardizem 10mg in ED  Ordered 1 dose of 150mg Amiodarone  Aggressive hydration   HAS-BLED score of 5, not a candidate of anticoagulation  F/u infectious work up to r/o sepsis  Will start amio drip if HR doesn't improve New onset AFib with HR in 140's  Demand troponin  s/p Cardizem 10mg in ED  Ordered 1 dose of 150mg Amiodarone  Aggressive hydration   HAS-BLED score of 5, not a candidate of anticoagulation  F/u infectious work up to r/o sepsis  Will start amio drip if HR doesn't improve  Consulted Dr Merino

## 2019-04-23 NOTE — H&P ADULT - PROBLEM SELECTOR PLAN 5
Improve VTE score: 2 (Heparin sq)  [ ] Previous VTE                                               3  [ ] Thrombophilia                                             2  [ ] Lower limb paralysis                                   2    [ ] Current Cancer                                           2   [x] Immobilization > 24 hrs                              1  [ ] ICU/CCU stay > 24 hours                            1  [x] Age > 60                                                       1

## 2019-04-23 NOTE — ED PROVIDER NOTE - OBJECTIVE STATEMENT
Pt w hx of htn, TIA here for weakness, increased palor worsening today, no assoc chest pain, sob, abd pain, nausea vomiting, diarrhea or recent hospitalizations. Daughter states dnr/dni status. Pt non verbal per daughter.

## 2019-04-23 NOTE — ED ADULT NURSE NOTE - ED STAT RN HANDOFF DETAILS
pt endorsed to [facility], pt is sleeping in stretcher, breathing unlabored. Currently stable. baseline mental status.

## 2019-04-23 NOTE — H&P ADULT - PROBLEM SELECTOR PLAN 4
Holding home Zetia and Plavix due to AMS and poor mental status Holding home Zetia and Plavix due to AMS and poor mental status  Consulted Dr Gaines

## 2019-04-23 NOTE — ED ADULT TRIAGE NOTE - CHIEF COMPLAINT QUOTE
Daughter stated" my mother looked pale and diaphoretic ,and u on checking her vitals, found to have elevated pulse".

## 2019-04-23 NOTE — H&P ADULT - NSICDXPASTMEDICALHX_GEN_ALL_CORE_FT
PAST MEDICAL HISTORY:  HTN (hypertension)     Hypercholesterolemia     TIA (transient ischemic attack)

## 2019-04-23 NOTE — H&P ADULT - ASSESSMENT
89 Female with PMH of HTN, dementia, bedbound, TIA, HLD was brought to hospital for increased heart rate and low blood pressure at home. Admitted to tele for new onset Afib with RVR.

## 2019-04-23 NOTE — H&P ADULT - HISTORY OF PRESENT ILLNESS
89 female with PMH of HTN, dementia, bedbound, TIA, HLD was brought to hospital for increased heart rate and low blood pressure at home as checked by daughter. 89 Female with PMH of HTN, dementia, bedbound, TIA, HLD was brought to hospital for increased heart rate and low blood pressure at home as checked by daughter. Pt is bedbound and nonverbal at baseline and is dependent on HHA for daily activities. She denied having any symptoms or complaints. Daughter denied having any cough, fever, chest pain, problems with urine or bowel.    ED Course: HR of 105 and BP was 96/55. Labs showed hemoconcentration and MIN on CKD. Cardiac enzymes were elevated to 0.831. EKG showed new onset Afib with RVR. Pt was given 1 lit bolus and 10 cardizem. 89 Female with PMH of HTN, dementia, bedbound, TIA, HLD was brought to hospital for increased heart rate and low blood pressure at home as checked by daughter. Pt is bedbound and nonverbal at baseline and is dependent on HHA for daily activities. She denied having any symptoms or complaints. Daughter denied having any cough, fever, chest pain, problems with urine or bowel.    ED Course: HR of 105 and BP was 96/55. Labs showed hemoconcentration and MIN on CKD. Cardiac enzymes were elevated to 0.831. EKG showed new onset Afib with RVR. Pt was given 1 lit bolus, rocephine, zithromax and 10 cardizem.

## 2019-04-23 NOTE — H&P ADULT - NSHPPHYSICALEXAM_GEN_ALL_CORE
Vital Signs Last 24 Hrs  T(C): 36.7 (23 Apr 2019 19:05), Max: 36.7 (23 Apr 2019 19:05)  T(F): 98 (23 Apr 2019 19:05), Max: 98 (23 Apr 2019 19:05)  HR: 132 (24 Apr 2019 00:45) (97 - 176)  BP: 90/63 (24 Apr 2019 00:45) (82/46 - 149/75)  RR: 18 (24 Apr 2019 00:45) (18 - 18)  SpO2: 99% (24 Apr 2019 00:45) (99% - 100%)  .  GENERAL: Well developed, Elderly female, NAD  HEENT:  Normocephalic/Atraumatic, sluggish light reflex, dry mucous membranes  NECK: Supple, no JVD  RESP: Symmetric movement of the chest, clear to auscultation bilaterally, no wheeze  CVS: S1 and S2 audible, no murmur, rubs or gallops noted  GI: Normal active bowel sounds present, abdomen soft, + tender  EXTREMITIES:  No edema, no clubbing, cyanosis  MSK: 0/5 strength bilateral upper and lower extremities    NEURO: Alert and oriented x 1

## 2019-04-23 NOTE — H&P ADULT - ATTENDING COMMENTS
ICU Vital Signs Last 24 Hrs  T(C): 36.7 (23 Apr 2019 19:05), Max: 36.7 (23 Apr 2019 19:05)  T(F): 98 (23 Apr 2019 19:05), Max: 98 (23 Apr 2019 19:05)  HR: 146 (23 Apr 2019 23:33) (97 - 176)  BP: 94/69 (23 Apr 2019 23:33) (82/46 - 149/75)  BP(mean): --  ABP: --  ABP(mean): --  RR: 18 (23 Apr 2019 23:33) (18 - 18)  SpO2: 100% (23 Apr 2019 23:33) (100% - 100%) Patient seen and examined ; case was discussed with the admitting resident    ROS: as in the HPI; all other ROS negative    SH and family history as above    Vital Signs Last 24 Hrs  T(C): 36.3 (2019 07:18), Max: 36.7 (2019 19:05)  T(F): 97.4 (2019 07:18), Max: 98 (2019 19:05)  HR: 84 (2019 07:18) (83 - 176)  BP: 119/65 (2019 07:18) (75/49 - 149/75)  BP(mean): --  RR: 22 (2019 07:18) (18 - 28)  SpO2: 98% (2019 07:18) (97% - 100%)    GEN: NAD  HEENT- normocephalic; mouth moist  CVS- S1S2+  LUNGS- decreased breath sounds   ABD: Soft , nontender, nondistended, Bowel sounds are present  EXTREMITY: no calf tenderness, no cyanosis, no edema  NEURO: nonverbal, not following simple commands   VASCULAR: ++ distal peripheral pulses  SKIN: warm and dry, pale        Labs Reviewed:                         10.6   7.54  )-----------( 217      ( 2019 06:28 )             34.4     04-24    142  |  113<H>  |  29<H>  ----------------------------<  122<H>  3.3<L>   |  23  |  2.61<H>    Ca    7.3<L>      2019 06:28  Phos  3.1     04-24  Mg     1.6     04-24    TPro  5.7<L>  /  Alb  2.6<L>  /  TBili  0.4  /  DBili  x   /  AST  23  /  ALT  9<L>  /  AlkPhos  58  04-24    CARDIAC MARKERS ( 2019 21:22 )  0.831 ng/mL / x     / x     / x     / x          Urinalysis Basic - ( 2019 03:06 )    Color: Yellow / Appearance: Slightly Turbid / S.020 / pH: x  Gluc: x / Ketone: Negative  / Bili: Negative / Urobili: Negative   Blood: x / Protein: 30 mg/dL / Nitrite: Negative   Leuk Esterase: Small / RBC: 0-2 /HPF / WBC 3-5 /HPF   Sq Epi: x / Non Sq Epi: Few /HPF / Bacteria: Moderate /HPF      PTT - ( 2019 21:22 )  PTT:22.1 sec  BNP: Serum Pro-Brain Natriuretic Peptide: 71177 pg/mL ( @ 06:28)  Serum Pro-Brain Natriuretic Peptide: 50005 pg/mL ( @ 21:22)    MEDICATIONS  (STANDING):  cefTRIAXone   IVPB 1 Gram(s) IV Intermittent every 24 hours  heparin  Injectable 5000 Unit(s) SubCutaneous every 8 hours  sodium chloride 0.9%. 1000 milliLiter(s) (100 mL/Hr) IV Continuous <Continuous>    CXR reviewed    EKG Reviewed    89 yo F nonverbal with dementia, tia, ckd admitted with hypotension 2/2 a fib which is new.     1. A fib with RVR- hemodynamically unstable with pulmonary edema and hypotension-responded well to amio loading x2, suspect provoked by acute pulmonary process possible PNA vs PE, although diagnostic eval for pe is limited in setting of abnl CXR and MIN on CKD, will get LE dopplers for now. Chads Vasc of at least 6 but HAS Bled 8 making her at a high risk for bleeding events. Patient with decrease in hemoglobin and pallor pta, will defer AC pending full cardiac evaluation and monitoring. Patient converted well with amio.   2. NSTEMI- trend troponin, echo, tele  3. CAP-opacities on cxr possibly secondary to pulmonary edema but possible underlying CAP, will cover for now  4. MIN on CKD IIIb- hypotension, prerenal, possible atn   5. Hypotension- 2/2 rapid afib   6. Dementia- speech and swallow eval, appreciate palliative assistance, high fast score       Plan of care discussed with patient ;  all questions and concerns were addressed.  Discussed with Patient's family

## 2019-04-24 DIAGNOSIS — G45.9 TRANSIENT CEREBRAL ISCHEMIC ATTACK, UNSPECIFIED: ICD-10-CM

## 2019-04-24 DIAGNOSIS — N17.9 ACUTE KIDNEY FAILURE, UNSPECIFIED: ICD-10-CM

## 2019-04-24 DIAGNOSIS — Z29.9 ENCOUNTER FOR PROPHYLACTIC MEASURES, UNSPECIFIED: ICD-10-CM

## 2019-04-24 DIAGNOSIS — I95.9 HYPOTENSION, UNSPECIFIED: ICD-10-CM

## 2019-04-24 DIAGNOSIS — F03.90 UNSPECIFIED DEMENTIA WITHOUT BEHAVIORAL DISTURBANCE: ICD-10-CM

## 2019-04-24 DIAGNOSIS — E43 UNSPECIFIED SEVERE PROTEIN-CALORIE MALNUTRITION: ICD-10-CM

## 2019-04-24 DIAGNOSIS — J18.9 PNEUMONIA, UNSPECIFIED ORGANISM: ICD-10-CM

## 2019-04-24 DIAGNOSIS — I48.91 UNSPECIFIED ATRIAL FIBRILLATION: ICD-10-CM

## 2019-04-24 DIAGNOSIS — R53.2 FUNCTIONAL QUADRIPLEGIA: ICD-10-CM

## 2019-04-24 DIAGNOSIS — Z51.5 ENCOUNTER FOR PALLIATIVE CARE: ICD-10-CM

## 2019-04-24 PROBLEM — E78.00 PURE HYPERCHOLESTEROLEMIA, UNSPECIFIED: Chronic | Status: ACTIVE | Noted: 2018-05-12

## 2019-04-24 PROBLEM — I10 ESSENTIAL (PRIMARY) HYPERTENSION: Chronic | Status: ACTIVE | Noted: 2018-05-12

## 2019-04-24 LAB
ALBUMIN SERPL ELPH-MCNC: 2.6 G/DL — LOW (ref 3.5–5)
ALP SERPL-CCNC: 58 U/L — SIGNIFICANT CHANGE UP (ref 40–120)
ALT FLD-CCNC: 9 U/L DA — LOW (ref 10–60)
ANION GAP SERPL CALC-SCNC: 6 MMOL/L — SIGNIFICANT CHANGE UP (ref 5–17)
APPEARANCE UR: ABNORMAL
AST SERPL-CCNC: 23 U/L — SIGNIFICANT CHANGE UP (ref 10–40)
BASOPHILS # BLD AUTO: 0.03 K/UL — SIGNIFICANT CHANGE UP (ref 0–0.2)
BASOPHILS NFR BLD AUTO: 0.4 % — SIGNIFICANT CHANGE UP (ref 0–2)
BILIRUB SERPL-MCNC: 0.4 MG/DL — SIGNIFICANT CHANGE UP (ref 0.2–1.2)
BILIRUB UR-MCNC: NEGATIVE — SIGNIFICANT CHANGE UP
BUN SERPL-MCNC: 29 MG/DL — HIGH (ref 7–18)
CALCIUM SERPL-MCNC: 7.3 MG/DL — LOW (ref 8.4–10.5)
CHLORIDE SERPL-SCNC: 113 MMOL/L — HIGH (ref 96–108)
CHOLEST SERPL-MCNC: 201 MG/DL — HIGH (ref 10–199)
CK MB BLD-MCNC: 12.1 % — HIGH (ref 0–3.5)
CK MB CFR SERPL CALC: 5.1 NG/ML — HIGH (ref 0–3.6)
CK SERPL-CCNC: 42 U/L — SIGNIFICANT CHANGE UP (ref 21–215)
CO2 SERPL-SCNC: 23 MMOL/L — SIGNIFICANT CHANGE UP (ref 22–31)
COLOR SPEC: YELLOW — SIGNIFICANT CHANGE UP
CREAT SERPL-MCNC: 2.61 MG/DL — HIGH (ref 0.5–1.3)
DIFF PNL FLD: ABNORMAL
EOSINOPHIL # BLD AUTO: 0.05 K/UL — SIGNIFICANT CHANGE UP (ref 0–0.5)
EOSINOPHIL NFR BLD AUTO: 0.7 % — SIGNIFICANT CHANGE UP (ref 0–6)
FOLATE SERPL-MCNC: 4.8 NG/ML — SIGNIFICANT CHANGE UP
GLUCOSE SERPL-MCNC: 122 MG/DL — HIGH (ref 70–99)
GLUCOSE UR QL: NEGATIVE — SIGNIFICANT CHANGE UP
HBA1C BLD-MCNC: 5.7 % — HIGH (ref 4–5.6)
HCT VFR BLD CALC: 34.4 % — LOW (ref 34.5–45)
HDLC SERPL-MCNC: 38 MG/DL — LOW
HGB BLD-MCNC: 10.6 G/DL — LOW (ref 11.5–15.5)
IMM GRANULOCYTES NFR BLD AUTO: 0.9 % — SIGNIFICANT CHANGE UP (ref 0–1.5)
KETONES UR-MCNC: NEGATIVE — SIGNIFICANT CHANGE UP
LEUKOCYTE ESTERASE UR-ACNC: ABNORMAL
LIPID PNL WITH DIRECT LDL SERPL: 144 MG/DL — SIGNIFICANT CHANGE UP
LYMPHOCYTES # BLD AUTO: 1.37 K/UL — SIGNIFICANT CHANGE UP (ref 1–3.3)
LYMPHOCYTES # BLD AUTO: 18.2 % — SIGNIFICANT CHANGE UP (ref 13–44)
MAGNESIUM SERPL-MCNC: 1.6 MG/DL — SIGNIFICANT CHANGE UP (ref 1.6–2.6)
MCHC RBC-ENTMCNC: 29 PG — SIGNIFICANT CHANGE UP (ref 27–34)
MCHC RBC-ENTMCNC: 30.8 GM/DL — LOW (ref 32–36)
MCV RBC AUTO: 94 FL — SIGNIFICANT CHANGE UP (ref 80–100)
MONOCYTES # BLD AUTO: 0.57 K/UL — SIGNIFICANT CHANGE UP (ref 0–0.9)
MONOCYTES NFR BLD AUTO: 7.6 % — SIGNIFICANT CHANGE UP (ref 2–14)
NEUTROPHILS # BLD AUTO: 5.45 K/UL — SIGNIFICANT CHANGE UP (ref 1.8–7.4)
NEUTROPHILS NFR BLD AUTO: 72.2 % — SIGNIFICANT CHANGE UP (ref 43–77)
NITRITE UR-MCNC: NEGATIVE — SIGNIFICANT CHANGE UP
NRBC # BLD: 0 /100 WBCS — SIGNIFICANT CHANGE UP (ref 0–0)
NT-PROBNP SERPL-SCNC: HIGH PG/ML (ref 0–450)
PH UR: 5 — SIGNIFICANT CHANGE UP (ref 5–8)
PHOSPHATE SERPL-MCNC: 3.1 MG/DL — SIGNIFICANT CHANGE UP (ref 2.5–4.5)
PLATELET # BLD AUTO: 217 K/UL — SIGNIFICANT CHANGE UP (ref 150–400)
POTASSIUM SERPL-MCNC: 3.3 MMOL/L — LOW (ref 3.5–5.3)
POTASSIUM SERPL-SCNC: 3.3 MMOL/L — LOW (ref 3.5–5.3)
PROCALCITONIN SERPL-MCNC: 0.1 NG/ML — SIGNIFICANT CHANGE UP (ref 0.02–0.1)
PROT SERPL-MCNC: 5.7 G/DL — LOW (ref 6–8.3)
PROT UR-MCNC: 30 MG/DL
RBC # BLD: 3.66 M/UL — LOW (ref 3.8–5.2)
RBC # FLD: 14.9 % — HIGH (ref 10.3–14.5)
SODIUM SERPL-SCNC: 142 MMOL/L — SIGNIFICANT CHANGE UP (ref 135–145)
SODIUM UR-SCNC: 45 MMOL/L — SIGNIFICANT CHANGE UP (ref 40–220)
SP GR SPEC: 1.02 — SIGNIFICANT CHANGE UP (ref 1.01–1.02)
T4 AB SER-ACNC: 7.2 UG/DL — SIGNIFICANT CHANGE UP (ref 4.6–12)
TOTAL CHOLESTEROL/HDL RATIO MEASUREMENT: 5.3 RATIO — SIGNIFICANT CHANGE UP (ref 3.3–7.1)
TRIGL SERPL-MCNC: 97 MG/DL — SIGNIFICANT CHANGE UP (ref 10–149)
TROPONIN I SERPL-MCNC: 0.51 NG/ML — HIGH (ref 0–0.04)
TSH SERPL-MCNC: 2.42 UU/ML — SIGNIFICANT CHANGE UP (ref 0.34–4.82)
UROBILINOGEN FLD QL: NEGATIVE — SIGNIFICANT CHANGE UP
VIT B12 SERPL-MCNC: 331 PG/ML — SIGNIFICANT CHANGE UP (ref 232–1245)
WBC # BLD: 7.54 K/UL — SIGNIFICANT CHANGE UP (ref 3.8–10.5)
WBC # FLD AUTO: 7.54 K/UL — SIGNIFICANT CHANGE UP (ref 3.8–10.5)

## 2019-04-24 PROCEDURE — 99223 1ST HOSP IP/OBS HIGH 75: CPT

## 2019-04-24 PROCEDURE — 93970 EXTREMITY STUDY: CPT | Mod: 26

## 2019-04-24 PROCEDURE — 99233 SBSQ HOSP IP/OBS HIGH 50: CPT

## 2019-04-24 RX ORDER — AMIODARONE HYDROCHLORIDE 400 MG/1
150 TABLET ORAL ONCE
Qty: 0 | Refills: 0 | Status: COMPLETED | OUTPATIENT
Start: 2019-04-24 | End: 2019-04-24

## 2019-04-24 RX ORDER — TELMISARTAN 20 MG/1
1 TABLET ORAL
Qty: 0 | Refills: 0 | COMMUNITY

## 2019-04-24 RX ORDER — IPRATROPIUM/ALBUTEROL SULFATE 18-103MCG
2 AEROSOL WITH ADAPTER (GRAM) INHALATION
Qty: 0 | Refills: 0 | COMMUNITY

## 2019-04-24 RX ORDER — CEFTRIAXONE 500 MG/1
1 INJECTION, POWDER, FOR SOLUTION INTRAMUSCULAR; INTRAVENOUS EVERY 24 HOURS
Qty: 0 | Refills: 0 | Status: DISCONTINUED | OUTPATIENT
Start: 2019-04-24 | End: 2019-04-26

## 2019-04-24 RX ORDER — MAGNESIUM OXIDE 400 MG ORAL TABLET 241.3 MG
1 TABLET ORAL
Qty: 0 | Refills: 0 | COMMUNITY

## 2019-04-24 RX ORDER — EZETIMIBE 10 MG/1
1 TABLET ORAL
Qty: 0 | Refills: 0 | COMMUNITY

## 2019-04-24 RX ORDER — SODIUM CHLORIDE 9 MG/ML
1000 INJECTION INTRAMUSCULAR; INTRAVENOUS; SUBCUTANEOUS
Qty: 0 | Refills: 0 | Status: DISCONTINUED | OUTPATIENT
Start: 2019-04-24 | End: 2019-04-24

## 2019-04-24 RX ORDER — SODIUM CHLORIDE 9 MG/ML
1000 INJECTION INTRAMUSCULAR; INTRAVENOUS; SUBCUTANEOUS ONCE
Qty: 0 | Refills: 0 | Status: COMPLETED | OUTPATIENT
Start: 2019-04-24 | End: 2019-04-24

## 2019-04-24 RX ORDER — LINACLOTIDE 145 UG/1
1 CAPSULE, GELATIN COATED ORAL
Qty: 0 | Refills: 0 | COMMUNITY

## 2019-04-24 RX ORDER — HEPARIN SODIUM 5000 [USP'U]/ML
5000 INJECTION INTRAVENOUS; SUBCUTANEOUS EVERY 8 HOURS
Qty: 0 | Refills: 0 | Status: DISCONTINUED | OUTPATIENT
Start: 2019-04-24 | End: 2019-04-26

## 2019-04-24 RX ORDER — POTASSIUM CHLORIDE 20 MEQ
10 PACKET (EA) ORAL
Qty: 0 | Refills: 0 | Status: COMPLETED | OUTPATIENT
Start: 2019-04-24 | End: 2019-04-24

## 2019-04-24 RX ORDER — SODIUM CHLORIDE 9 MG/ML
1000 INJECTION INTRAMUSCULAR; INTRAVENOUS; SUBCUTANEOUS
Qty: 0 | Refills: 0 | Status: DISCONTINUED | OUTPATIENT
Start: 2019-04-24 | End: 2019-04-26

## 2019-04-24 RX ORDER — AMIODARONE HYDROCHLORIDE 400 MG/1
150 TABLET ORAL ONCE
Qty: 0 | Refills: 0 | Status: DISCONTINUED | OUTPATIENT
Start: 2019-04-24 | End: 2019-04-24

## 2019-04-24 RX ADMIN — SODIUM CHLORIDE 2000 MILLILITER(S): 9 INJECTION INTRAMUSCULAR; INTRAVENOUS; SUBCUTANEOUS at 01:15

## 2019-04-24 RX ADMIN — Medication 100 MILLIEQUIVALENT(S): at 12:30

## 2019-04-24 RX ADMIN — AMIODARONE HYDROCHLORIDE 150 MILLIGRAM(S): 400 TABLET ORAL at 03:05

## 2019-04-24 RX ADMIN — SODIUM CHLORIDE 100 MILLILITER(S): 9 INJECTION INTRAMUSCULAR; INTRAVENOUS; SUBCUTANEOUS at 05:22

## 2019-04-24 RX ADMIN — SODIUM CHLORIDE 50 MILLILITER(S): 9 INJECTION INTRAMUSCULAR; INTRAVENOUS; SUBCUTANEOUS at 12:31

## 2019-04-24 RX ADMIN — HEPARIN SODIUM 5000 UNIT(S): 5000 INJECTION INTRAVENOUS; SUBCUTANEOUS at 22:41

## 2019-04-24 RX ADMIN — Medication 100 MILLIEQUIVALENT(S): at 11:26

## 2019-04-24 RX ADMIN — HEPARIN SODIUM 5000 UNIT(S): 5000 INJECTION INTRAVENOUS; SUBCUTANEOUS at 05:19

## 2019-04-24 RX ADMIN — HEPARIN SODIUM 5000 UNIT(S): 5000 INJECTION INTRAVENOUS; SUBCUTANEOUS at 13:19

## 2019-04-24 NOTE — CONSULT NOTE ADULT - PROBLEM SELECTOR RECOMMENDATION 3
Family reports poor po intake at home.  Albumin 2.2, with skin failure.  Diet as tolerated.  Dietitian carrington Family reports poor po intake at home.  Albumin 2.2.  High risk for skin failure.    Diet as tolerated.  Dietitian carrington

## 2019-04-24 NOTE — CONSULT NOTE ADULT - SUBJECTIVE AND OBJECTIVE BOX
89 Female with PMH of HTN, dementia, bedbound, TIA, HLD was brought to hospital for increased heart rate and low blood pressure at home as checked by daughter. Pt is bedbound and nonverbal at baseline and is dependent on HHA for daily activities. She denied having any symptoms or complaints. Daughter denied having any cough, fever, chest pain, problems with urine or bowel.    ED Course: HR of 105 and BP was 96/55. Labs showed hemoconcentration and MIN on CKD. Cardiac enzymes were elevated to 0.831. EKG showed new onset Afib with RVR. Pt was given 2  lit bolus, rocephine, zithromax and 10 cardizem.   ekg :  a fib   icu  was consulted for hypotension and afib with RVR   Patient was given 150  mg i.v amiodarone push  and s/p  bolus b.p  -107/60          MEDICATIONS  (STANDING):  amiodarone Injectable 150 milliGRAM(s) IV Push once  heparin  Injectable 5000 Unit(s) SubCutaneous every 8 hours  sodium chloride 0.9%. 1000 milliLiter(s) (100 mL/Hr) IV Continuous <Continuous>    MEDICATIONS  (PRN):      Allergies    No Known Allergies    Intolerances        REVIEW OF SYSTEMS: unobtainable     Vital Signs Last 24 Hrs  T(C): 36.7 (23 Apr 2019 19:05), Max: 36.7 (23 Apr 2019 19:05)  T(F): 98 (23 Apr 2019 19:05), Max: 98 (23 Apr 2019 19:05)  HR: 95 (24 Apr 2019 01:20) (95 - 176)  BP: 102/68 (24 Apr 2019 01:36) (75/49 - 149/75)  BP(mean): --  RR: 18 (24 Apr 2019 01:20) (18 - 18)  SpO2: 99% (24 Apr 2019 01:20) (99% - 100%)    PHYSICAL EXAM:  	GENERAL: Well developed, Elderly female, NAD  	HEENT:  Normocephalic/Atraumatic, sluggish light reflex, dry mucous membranes  	NECK: Supple, no JVD  	RESP: Symmetric movement of the chest, clear to auscultation bilaterally, no wheeze  	CVS: S1 and S2 audible, no murmur, rubs or gallops noted  	GI: Normal active bowel sounds present, abdomen soft, + tender  	EXTREMITIES:  No edema, no clubbing, cyanosis  	MSK: 0/5 strength bilateral upper and no  edema   neuro  :  alert and awake   LABS:                        13.1   8.52  )-----------( 244      ( 23 Apr 2019 21:22 )             40.4     04-23    141  |  109<H>  |  34<H>  ----------------------------<  146<H>  3.5   |  23  |  2.91<H>    Ca    8.2<L>      23 Apr 2019 21:22    TPro  6.5  /  Alb  2.9<L>  /  TBili  0.4  /  DBili  x   /  AST  24  /  ALT  9<L>  /  AlkPhos  67  04-23    PTT - ( 23 Apr 2019 21:22 )  PTT:22.1 sec    CAPILLARY BLOOD GLUCOSE      POCT Blood Glucose.: 153 mg/dL (23 Apr 2019 19:17)      RADIOLOGY & ADDITIONAL TESTS:    Imaging Personally Reviewed:  cxr  :  no infiltrates

## 2019-04-24 NOTE — CONSULT NOTE ADULT - PROBLEM SELECTOR RECOMMENDATION 5
Met with the patient's daughter Catherine Reynoso ([phone])  at the bedside to discuss clinical status and further goals of care.  DNR/DNI on file.  Daughter acknowledges pt's progressive decline, but she also believes she is of good health given she is 89 years old.   Explained that given the pt's clinical status she is appropriate for home hospice.  Described the hospice philosophy and locations of care.  Per the daughter, the patient is not ready for hospice.  She would like to see how the pt does and maybe when she goes home they will discuss hospice.  All questions answered.  Support provided.     Advanced care planning time spent 20 minutes.

## 2019-04-24 NOTE — CONSULT NOTE ADULT - PROBLEM SELECTOR RECOMMENDATION 9
Likely 2/2 dehydration. Pre-renal   Baseline Cr of 1.4.  IV hydration, Improving    -Avoid NSAIDs
chadvas score of 4   s/p  amiodarone 150  mg i.v push   -c/w rate control  medications and f/u  with echo   -f.u with tsh  level in am   -f/u  with procalcitonin  in am to  r/o  any  infectious etiology for a fib   -c/w with tele

## 2019-04-24 NOTE — CONSULT NOTE ADULT - ASSESSMENT
89 Female with PMH of HTN, dementia, bedbound, TIA, HLD was brought to hospital for increased heart rate and low blood pressure at home as checked by daughter. Pt is bedbound and nonverbal at baseline and is dependent on HHA for daily activities. She denied having any symptoms or complaints. Daughter denied having any cough, fever, chest pain, problems with urine or bowel.    ED Course: HR of 105 and BP was 96/55. Labs showed hemoconcentration and MIN on CKD. Cardiac enzymes were elevated to 0.831. EKG showed new onset Afib with RVR. Pt was given 2  lit bolus, rocephine, zithromax and 10 cardizem.   ekg :  a fib   icu  was consulted for hypotension and afib with RVR   Patient was given 150  mg i.v amiodarone push  and s/p  bolus b.p  -107/60        1} afib with rvr   2} tachycardia induced hypotension  3} min    4} need for prophylactic pressure

## 2019-04-24 NOTE — CONSULT NOTE ADULT - ATTENDING COMMENTS
Patient was seen and examined,interim events noted,labs and radiology studies reviewed.  Clemente Merino MD,FACC.  [address] [place] [address].  [id number].  [phone]
89 Female with PMH of HTN, dementia, bedbound, TIA, HLD was brought to hospital for increased heart rate and low blood pressure at home as checked by daughter. Pt is bedbound and nonverbal at baseline and is dependent on HHA for daily activities. She denied having any symptoms or complaints. Daughter denied having any cough, fever, chest pain, problems with urine or bowel.    ED Course: HR of 105 and BP was 96/55. Labs showed hemoconcentration and MIN on CKD. Cardiac enzymes were elevated to 0.831. EKG showed new onset Afib with RVR. Pt was given 2  lit bolus, rocephine, zithromax and 10 cardizem.   ekg :  a fib   icu  was consulted for hypotension and afib with RVR   Patient was given 150  mg i.v amiodarone push  and s/p  bolus b.p  -107/60        1} afib with rvr - newly diagnosed, admit Tele, Cardio eval, 2D echo, discuss with family about risks v benefits of anticoagulation  2} tachycardia induced hypotension - resolved following resolution of Afib  3} min - gentle IVF hydration   Workup for sepsis, consider LE Duplex    Goals of care discussion with family should be prioritized given age and multiple comorbidities

## 2019-04-24 NOTE — CONSULT NOTE ADULT - SUBJECTIVE AND OBJECTIVE BOX
HPI:  89 Female with PMH of HTN, dementia, bedbound, TIA, HLD was brought to hospital for increased heart rate and low blood pressure at home as checked by daughter. Pt is bedbound and nonverbal at baseline and is dependent on HHA for daily activities. She denied having any symptoms or complaints. Daughter denied having any cough, fever, chest pain, problems with urine or bowel.    ED Course: HR of 105 and BP was 96/55. Labs showed hemoconcentration and MIN on CKD. Cardiac enzymes were elevated to 0.831. EKG showed new onset Afib with RVR. Pt was given 1 lit bolus, rocephine, zithromax and 10 cardizem. (2019 23:38)      Interval hx: Hospital course,     PAST MEDICAL & SURGICAL HISTORY:  TIA (transient ischemic attack)  Hypercholesterolemia  HTN (hypertension)  No significant past surgical history      SOCIAL HISTORY:    Admitted from:  home with 12h/7d daily  Patient has 2 children.  Her daughter Catherine is her surrogate.      Surrogate:  Catherine Nigelyolanda (dtr)            Phone#: [phone]    FAMILY HISTORY:  Pt unable to participate    Baseline ADLs (prior to admission):  BB, verbal on occasion    Allergies    No Known Allergies    Intolerances      Present Symptoms:    Unable to obtain due to poor mentation]    MEDICATIONS  (STANDING):  cefTRIAXone   IVPB 1 Gram(s) IV Intermittent every 24 hours  heparin  Injectable 5000 Unit(s) SubCutaneous every 8 hours  sodium chloride 0.9%. 1000 milliLiter(s) (50 mL/Hr) IV Continuous <Continuous>    MEDICATIONS  (PRN):      PHYSICAL EXAM:    Vital Signs Last 24 Hrs  T(C): 36.3 (2019 11:36), Max: 36.7 (2019 19:05)  T(F): 97.3 (2019 11:36), Max: 98 (2019 19:05)  HR: 81 (2019 11:36) (81 - 176)  BP: 141/72 (2019 11:36) (75/49 - 149/75)  BP(mean): --  RR: 19 (2019 11:36) (18 - 28)  SpO2: 99% (2019 11:36) (97% - 100%)    General: Fraile, non verbal, NAD  Karnofsky Performance Score/Palliative Performance Status Version2: 30     %    HEENT: oropharynx clear, + bitemporal wasting  Lungs: unlabored  CV: nS1S2, RRR  GI: soft, non tender  :  trejo  Musculoskeletal: no edema  Skin: fragile, no rash or lesions noted  Neuro: unable to follow commands  Oral intake ability: poor po intake  Diet: [NPO]    LABS:                        10.6   7.54  )-----------( 217      ( 2019 06:28 )             34.4     -24    142  |  113<H>  |  29<H>  ----------------------------<  122<H>  3.3<L>   |  23  |  2.61<H>    Ca    7.3<L>      2019 06:28  Phos  3.1     -  Mg     1.6         TPro  5.7<L>  /  Alb  2.6<L>  /  TBili  0.4  /  DBili  x   /  AST  23  /  ALT  9<L>  /  AlkPhos  58      Urinalysis Basic - ( 2019 03:06 )    Color: Yellow / Appearance: Slightly Turbid / S.020 / pH: x  Gluc: x / Ketone: Negative  / Bili: Negative / Urobili: Negative   Blood: x / Protein: 30 mg/dL / Nitrite: Negative   Leuk Esterase: Small / RBC: 0-2 /HPF / WBC 3-5 /HPF   Sq Epi: x / Non Sq Epi: Few /HPF / Bacteria: Moderate /HPF        RADIOLOGY & ADDITIONAL STUDIES:  Reviewed    ADVANCE DIRECTIVES: DNR/DNI HPI:  89 Female with PMH of HTN, dementia, bedbound, TIA, HLD was brought to hospital for increased heart rate and low blood pressure at home as checked by daughter. Pt is bedbound and nonverbal at baseline and is dependent on HHA for daily activities. She denied having any symptoms or complaints. Daughter denied having any cough, fever, chest pain, problems with urine or bowel.    ED Course: HR of 105 and BP was 96/55. Labs showed hemoconcentration and MIN on CKD. Cardiac enzymes were elevated to 0.831. EKG showed new onset Afib with RVR. Pt was given 1 lit bolus, rocephine, zithromax and 10 cardizem. (2019 23:38)      Interval hx: Hospital course, advanced dementia, BB, non verbal.  DNR/DNI on file.  Palliative care consulted to address hospice eligibility.      PAST MEDICAL & SURGICAL HISTORY:  TIA (transient ischemic attack)  Hypercholesterolemia  HTN (hypertension)  No significant past surgical history      SOCIAL HISTORY:    Admitted from:  home with 12h/7d daily  Patient has 2 children.  Her daughter Catherine is her surrogate.      Surrogate:  Catherine Mei (dtr)            Phone#: [phone]    FAMILY HISTORY:  Pt unable to participate    Baseline ADLs (prior to admission):  BB, verbal on occasion per family     Allergies    No Known Allergies    Intolerances      Present Symptoms:    Unable to obtain due to poor mentation    MEDICATIONS  (STANDING):  cefTRIAXone   IVPB 1 Gram(s) IV Intermittent every 24 hours  heparin  Injectable 5000 Unit(s) SubCutaneous every 8 hours  sodium chloride 0.9%. 1000 milliLiter(s) (50 mL/Hr) IV Continuous <Continuous>    MEDICATIONS  (PRN):      PHYSICAL EXAM:    Vital Signs Last 24 Hrs  T(C): 36.3 (2019 11:36), Max: 36.7 (2019 19:05)  T(F): 97.3 (2019 11:36), Max: 98 (2019 19:05)  HR: 81 (2019 11:36) (81 - 176)  BP: 141/72 (2019 11:36) (75/49 - 149/75)  BP(mean): --  RR: 19 (2019 11:36) (18 - 28)  SpO2: 99% (2019 11:36) (97% - 100%)    General: Fragile non verbal at the time of exam, NAD  Karnofsky Performance Score/Palliative Performance Status Version2: 30     %    HEENT: oropharynx clear, + bitemporal wasting  Lungs: unlabored  CV: nS1S2, RRR  GI: soft, non tender  :  trejo  Musculoskeletal: no edema  Skin: fragile, no rash or lesions noted  Neuro: unable to follow commands  Oral intake ability: poor po intake  Diet: [NPO]    LABS:                        10.6   7.54  )-----------( 217      ( 2019 06:28 )             34.4     04-24    142  |  113<H>  |  29<H>  ----------------------------<  122<H>  3.3<L>   |  23  |  2.61<H>    Ca    7.3<L>      2019 06:28  Phos  3.1     04-24  Mg     1.6     -24    TPro  5.7<L>  /  Alb  2.6<L>  /  TBili  0.4  /  DBili  x   /  AST  23  /  ALT  9<L>  /  AlkPhos  58  04-24    Urinalysis Basic - ( 2019 03:06 )    Color: Yellow / Appearance: Slightly Turbid / S.020 / pH: x  Gluc: x / Ketone: Negative  / Bili: Negative / Urobili: Negative   Blood: x / Protein: 30 mg/dL / Nitrite: Negative   Leuk Esterase: Small / RBC: 0-2 /HPF / WBC 3-5 /HPF   Sq Epi: x / Non Sq Epi: Few /HPF / Bacteria: Moderate /HPF        RADIOLOGY & ADDITIONAL STUDIES:  Reviewed    ADVANCE DIRECTIVES: DNR/DNI

## 2019-04-24 NOTE — CONSULT NOTE ADULT - PROBLEM SELECTOR RECOMMENDATION 2
Per daughter, pt is BB, minimally verbal, poor po intake.  Currently no behavior issues.  FAST 7D.  Pt is appropriate for hospice.
likely tachycardia induced hypotension   -patient b.p  improved after the heart  rate improved   -please monitor vitals   -f/u  with ua and procalcitonin to  r/o  infectious cause   -no  wbc count , no fever  and lactate -1.4 { sepsis less likely }

## 2019-04-24 NOTE — PROGRESS NOTE ADULT - ATTENDING COMMENTS
Patient seen and examined.   admitted with hypotension in the setting of A.fib with ervr. s/p Amiodarone dose in the ed.     s/p Hypotension from A.fib with RVR- resolved s/p iv hydration.   A.fib with rvr s/p Amiodarone x 1 dose, rate controlled. Cards consulted. Patient not a candidate for Anticoagulation.  MIN resolving with hydration.   Acute Cystitis- UA positive, on Ceftriaxone, follow up urine cultures. If negative d/c abx.     GOC discussion by Palliative appreciated. Patient DNR/DNI.   Pleasure feeds for now, patients daughter refusing tube feeds if patient fails speech swallow.

## 2019-04-24 NOTE — PROGRESS NOTE ADULT - SUBJECTIVE AND OBJECTIVE BOX
NP Note discussed with  Primary Attending    Patient is a 89y old  Female who presents with a chief complaint of Tachycardia and hypotension at home (2019 09:30)      INTERVAL HPI/OVERNIGHT EVENTS: no new complaints    MEDICATIONS  (STANDING):  cefTRIAXone   IVPB 1 Gram(s) IV Intermittent every 24 hours  heparin  Injectable 5000 Unit(s) SubCutaneous every 8 hours  potassium chloride  10 mEq/100 mL IVPB 10 milliEquivalent(s) IV Intermittent every 1 hour  sodium chloride 0.9%. 1000 milliLiter(s) (100 mL/Hr) IV Continuous <Continuous>    MEDICATIONS  (PRN):      __________________________________________________  REVIEW OF SYSTEMS:    limited as pt is mostly nonverbal    CONSTITUTIONAL: No fever,   EYES: no acute visual disturbances  NECK: No pain or stiffness  RESPIRATORY: No cough; No shortness of breath  CARDIOVASCULAR: No chest pain, no palpitations  GASTROINTESTINAL: No pain. No nausea or vomiting; No diarrhea   NEUROLOGICAL: No headache or numbness, no tremors  MUSCULOSKELETAL: No joint pain, no muscle pain  GENITOURINARY: no dysuria, no frequency, no hesitancy  PSYCHIATRY: no depression , no anxiety  ALL OTHER  ROS negative        Vital Signs Last 24 Hrs  T(C): 36.3 (2019 07:18), Max: 36.7 (2019 19:05)  T(F): 97.4 (2019 07:18), Max: 98 (2019 19:05)  HR: 84 (2019 07:18) (83 - 176)  BP: 119/65 (2019 07:18) (75/49 - 149/75)  BP(mean): --  RR: 22 (2019 07:18) (18 - 28)  SpO2: 98% (2019 07:18) (97% - 100%)    ________________________________________________  PHYSICAL EXAM:  GENERAL: bedbound, cachectic   HEENT: Normocephalic;  conjunctivae and sclerae clear; moist mucous membranes;   NECK : supple  CHEST/LUNG: Clear to auscultation bilaterally with good air entry   HEART: S1 S2  regular; no murmurs, gallops or rubs  ABDOMEN: Soft, Nontender, Nondistended; Bowel sounds present  : Herrera in place draining clear yellow urine   EXTREMITIES: no cyanosis; no edema; no calf tenderness  SKIN: warm and dry; no rash  NERVOUS SYSTEM:  Awake, nonverbal     _________________________________________________  LABS:                        10.6   7.54  )-----------( 217      ( 2019 06:28 )             34.4     -    142  |  113<H>  |  29<H>  ----------------------------<  122<H>  3.3<L>   |  23  |  2.61<H>    Ca    7.3<L>      2019 06:28  Phos  3.1     -  Mg     1.6         TPro  5.7<L>  /  Alb  2.6<L>  /  TBili  0.4  /  DBili  x   /  AST  23  /  ALT  9<L>  /  AlkPhos  58      PTT - ( 2019 21:22 )  PTT:22.1 sec  Urinalysis Basic - ( 2019 03:06 )    Color: Yellow / Appearance: Slightly Turbid / S.020 / pH: x  Gluc: x / Ketone: Negative  / Bili: Negative / Urobili: Negative   Blood: x / Protein: 30 mg/dL / Nitrite: Negative   Leuk Esterase: Small / RBC: 0-2 /HPF / WBC 3-5 /HPF   Sq Epi: x / Non Sq Epi: Few /HPF / Bacteria: Moderate /HPF      CAPILLARY BLOOD GLUCOSE      POCT Blood Glucose.: 153 mg/dL (2019 19:17)    RADIOLOGY & ADDITIONAL TESTS:    Imaging  Reviewed:  YES    Consultant(s) Notes Reviewed:   YES    Plan of care was discussed with patient and /or primary care giver; all questions and concerns were addressed

## 2019-04-24 NOTE — CONSULT NOTE ADULT - SUBJECTIVE AND OBJECTIVE BOX
CHIEF COMPLAINT:Tachycardia    HPI:-89 Female with PMH of HTN, dementia, bedbound, TIA, HLD was brought to hospital for increased heart rate and low blood pressure at home as checked by daughter. Pt is bedbound and nonverbal at baseline and is dependent on HHA for daily activities. She denied having any symptoms or complaints. Daughter denied having any cough, fever, chest pain, problems with urine or bowel.    ED Course: HR of 105 and BP was 96/55. Labs showed hemoconcentration and MIN on CKD. Cardiac enzymes were elevated to 0.831. EKG showed new onset Afib with RVR. Pt was given 1 lit bolus, rocephine, zithromax and 10mg  cardizem.    PAST MEDICAL & SURGICAL HISTORY:  TIA (transient ischemic attack)  Hypercholesterolemia  HTN (hypertension)  No significant past surgical history      MEDICATIONS  (STANDING):  cefTRIAXone   IVPB 1 Gram(s) IV Intermittent every 24 hours  heparin  Injectable 5000 Unit(s) SubCutaneous every 8 hours  sodium chloride 0.9%. 1000 milliLiter(s) (100 mL/Hr) IV Continuous <Continuous>    FAMILY HISTORY:  No family history of premature coronary artery disease or sudden cardiac death    SOCIAL HISTORY:  Smoking-Non Smoker  Alcohol-Denies  Ilicit Drug use-Denies    REVIEW OF SYSTEMS:  Constitutional: [ ] fever, [ ]weight loss, [ ]fatigue Activity [ ] Bedbound,[ ] Ambulates [ ] Unassisted[ ] Cane/Walker [ ] Assistence.  Eyes: [ ] visual changes  Respiratory: [ ]shortness of breath;  [ ] cough, [ ]wheezing, [ ]chills, [ ]hemoptysis  Cardiovascular: [ ] chest pain, [ ]palpitations, [ ]dizziness,  [ ]leg swelling[ ]orthopnea [ ]PND  Gastrointestinal: [ ] abdominal pain, [ ]nausea, [ ]vomiting,  [ ]diarrhea,[ ]constipation  Genitourinary: [ ] dysuria, [ ] hematuria  Neurologic: [ ] headaches [ ] tremors[ ] weakness  Skin: [ ] itching, [ ]burning, [ ] rashes  Endocrine: [ ] heat or cold intolerance  Musculoskeletal: [ ] joint pain or swelling; [ ] muscle, back, or extremity pain  Psychiatric: [ ] depression, [ ]anxiety, [ ]mood swings, or [ ]difficulty sleeping  Hematologic: [ ] easy bruising, [ ] bleeding gums       [ ] All others negative	  [x] Unable to obtain    Vital Signs Last 24 Hrs  T(C): 36.3 (24 Apr 2019 07:18), Max: 36.7 (23 Apr 2019 19:05)  T(F): 97.4 (24 Apr 2019 07:18), Max: 98 (23 Apr 2019 19:05)  HR: 84 (24 Apr 2019 07:18) (83 - 176)  BP: 119/65 (24 Apr 2019 07:18) (75/49 - 149/75)  RR: 22 (24 Apr 2019 07:18) (18 - 28)  SpO2: 98% (24 Apr 2019 07:18) (97% - 100%)  I&O's Summary      PHYSICAL EXAM:  General: No acute distress BMI-18.8  HEENT: EOMI, PERRL[ ] Icteric  Neck: Supple, No JVD  Lungs: Equal air entry bilaterally; [ ] Rales [ ] Rhonchi [ ] Wheezing  Heart: Irregular rate and rhythm;[x ] Murmurs-  2 /6 [x ] Systolic [ ] Diastolic [ ] Radiation,No rubs, or gallops  Abdomen: Nontender, bowel sounds present  Extremities: No clubbing, cyanosis, or edema[ ] Calf tenderness  Nervous system:  Alert & Oriented X1, no focal deficits  Psychiatric: Normal affect  Skin: No rashes or lesions      LABS:  04-24    142  |  113<H>  |  29<H>  ----------------------------<  122<H>  3.3<L>   |  23  |  2.61<H>    Ca    7.3<L>      24 Apr 2019 06:28  Phos  3.1     04-24  Mg     1.6     04-24    TPro  5.7<L>  /  Alb  2.6<L>  /  TBili  0.4  /  DBili  x   /  AST  23  /  ALT  9<L>  /  AlkPhos  58  04-24    Creatinine Trend: 2.61<--, 2.91<--                        10.6   7.54  )-----------( 217      ( 24 Apr 2019 06:28 )             34.4     PTT - ( 23 Apr 2019 21:22 )  PTT:22.1 sec    Lipid Panel: Cholesterol, Serum 201  Direct   HDL Cholesterol, Serum 38  Triglycerides, Serum 97    Cardiac Enzymes: CARDIAC MARKERS ( 23 Apr 2019 21:22 )  0.831 ng/mL / x     / x     / x     / x          Serum Pro-Brain Natriuretic Peptide: 25302 pg/mL (04-24-19 @ 06:28)  Serum Pro-Brain Natriuretic Peptide: 83087 pg/mL (04-23-19 @ 21:22)        RADIOLOGY-XR CHESTIMPRESSION:No acute infiltrate     ECG [my interpretation]:

## 2019-04-24 NOTE — CONSULT NOTE ADULT - PROBLEM SELECTOR RECOMMENDATION 4
improve score of 2 {  age >60  and icu  immobilization   c/w dvt prophylaxsis
Per family pt is BB, verbal when she is not feeling embarrassed.  High risk for skin failure.  Frequent positioning.

## 2019-04-24 NOTE — INPATIENT CERTIFICATION FOR MEDICARE PATIENTS - RISKS OF ADVERSE EVENTS
Concern for cardiopulmonary deterioration/Concern for neurologic deterioration/Concern for worsening infectious process/Concern for renal deterioration/Concern for delay in diagnosis and treatment

## 2019-04-24 NOTE — CONSULT NOTE ADULT - PROBLEM SELECTOR RECOMMENDATION 3
-likely pre -renal   baseline 1.4   alejo  over ckd   f/ u with urine lytes , protein creatine ratio and monitor urine out-put

## 2019-04-24 NOTE — PROGRESS NOTE ADULT - ASSESSMENT
89 Female with PMH of HTN, dementia, bedbound, TIA, HLD was brought to hospital for increased heart rate and low blood pressure at home as checked by daughter. Pt is bedbound and nonverbal at baseline and is dependent on HHA for daily activities. She denied having any symptoms or complaints. Daughter denied having any cough, fever, chest pain, problems with urine or bowel.    ED Course: HR of 105 and BP was 96/55. Labs showed hemoconcentration and MIN on CKD. Cardiac enzymes were elevated to 0.831. EKG showed new onset Afib with RVR. Pt was given 2  lit bolus, rocephine, zithromax and 10 cardizem.   ekg :  a fib   icu  was consulted for hypotension and afib with RVR   Patient was given 150  mg i.v amiodarone push  and s/p  bolus b.p  -107/60      Problem/Recommendation - 1:  Problem: Atrial fibrillation with rapid ventricular response. Recommendation: chadvas score of 4   s/p  amiodarone 150  mg i.v push   -c/w rate control  medications and f/u  with echo   -f.u with tsh  level in am   -f/u  with procalcitonin  in am to  r/o  any  infectious etiology for a fib   -c/w with tele.    Problem/Recommendation - 2:  ·  Problem: Hypotension.  Recommendation: likely tachycardia induced hypotension   -patient b.p  improved after the heart  rate improved   -please monitor vitals   -f/u  with ua and procalcitonin to  r/o  infectious cause   -no  wbc count , no fever  and lactate -1.4 { sepsis less likely }.     Problem/Recommendation - 3:  ·  Problem: MIN (acute kidney injury).  Recommendation: -likely pre -renal   baseline 1.4   min  over ckd   f/ u with urine lytes , protein creatine ratio and monitor urine out-put.     Problem/Recommendation - 4:  ·  Problem: Need for prophylactic measure.  Recommendation: improve score of 2 {  age >60  and icu  immobilization   c/w dvt prophylaxsis. 89 Female with PMH of HTN, dementia, bedbound, TIA, HLD was brought to hospital for increased heart rate and low blood pressure at home as checked by daughter. Pt is bedbound and nonverbal at baseline and is dependent on HHA for daily activities. She denied having any symptoms or complaints. Daughter denied having any cough, fever, chest pain, problems with urine or bowel.    ED Course: HR of 105 and BP was 96/55. Labs showed hemoconcentration and MIN on CKD. Cardiac enzymes were elevated to 0.831. EKG showed new onset Afib with RVR. Pt was given 2  lit bolus, rocephine, zithromax and 10 cardizem.   ekg :  a fib   icu  was consulted for hypotension and afib with RVR   Patient was given 150  mg i.v amiodarone push  and s/p  bolus b.p  -107/60

## 2019-04-24 NOTE — PROGRESS NOTE ADULT - PROBLEM SELECTOR PLAN 1
Now Now SR 80s  s/p  amiodarone 150mg IV push overnight  f/u TSH and procalcitonin  Not a candidate for AC  Cardiology Dr. Merino

## 2019-04-24 NOTE — CONSULT NOTE ADULT - REASON FOR ADMISSION
Tachycardia and hypotension at home

## 2019-04-24 NOTE — CONSULT NOTE ADULT - ASSESSMENT
· Assessment		  89 Female with PMH of HTN, dementia, bedbound, TIA, HLD was brought to hospital for increased heart rate and low blood pressure at home. Admitted to tele for new onset Afib with RVR.         Problem/Plan - 1:  ·  Problem: Atrial fibrillation with rapid ventricular response.  Plan: New onset AFib with HR in 140's  HAS-BLED score of 5, not a candidate of anticoagulation  Given bolus Amiodarone.  Rate controlled.  TTE-LV Function.  If BP stable keep on BBlockers      Problem/Plan - 2:  ·  Problem: Hypotension.  Plan: Pt takes Micardis at home  Holding due to hypotension  Aggressive IV hydration and rate control.       Problem/Plan - 3:  ·  Problem: MIN (acute kidney injury).  Plan: Baseline Cr of 1.4  Pre-renal azotemia of 2.91

## 2019-04-25 DIAGNOSIS — N17.9 ACUTE KIDNEY FAILURE, UNSPECIFIED: ICD-10-CM

## 2019-04-25 LAB
ALBUMIN SERPL ELPH-MCNC: 2.5 G/DL — LOW (ref 3.5–5)
ALP SERPL-CCNC: 60 U/L — SIGNIFICANT CHANGE UP (ref 40–120)
ALT FLD-CCNC: 8 U/L DA — LOW (ref 10–60)
ANION GAP SERPL CALC-SCNC: 6 MMOL/L — SIGNIFICANT CHANGE UP (ref 5–17)
AST SERPL-CCNC: 20 U/L — SIGNIFICANT CHANGE UP (ref 10–40)
BILIRUB SERPL-MCNC: 0.3 MG/DL — SIGNIFICANT CHANGE UP (ref 0.2–1.2)
BUN SERPL-MCNC: 23 MG/DL — HIGH (ref 7–18)
CALCIUM SERPL-MCNC: 7.9 MG/DL — LOW (ref 8.4–10.5)
CHLORIDE SERPL-SCNC: 117 MMOL/L — HIGH (ref 96–108)
CK MB BLD-MCNC: 7.9 % — HIGH (ref 0–3.5)
CK MB CFR SERPL CALC: 3.4 NG/ML — SIGNIFICANT CHANGE UP (ref 0–3.6)
CK SERPL-CCNC: 43 U/L — SIGNIFICANT CHANGE UP (ref 21–215)
CO2 SERPL-SCNC: 22 MMOL/L — SIGNIFICANT CHANGE UP (ref 22–31)
CREAT SERPL-MCNC: 2.21 MG/DL — HIGH (ref 0.5–1.3)
CULTURE RESULTS: NO GROWTH — SIGNIFICANT CHANGE UP
GLUCOSE SERPL-MCNC: 91 MG/DL — SIGNIFICANT CHANGE UP (ref 70–99)
HCT VFR BLD CALC: 34.5 % — SIGNIFICANT CHANGE UP (ref 34.5–45)
HGB BLD-MCNC: 10.7 G/DL — LOW (ref 11.5–15.5)
MCHC RBC-ENTMCNC: 28.8 PG — SIGNIFICANT CHANGE UP (ref 27–34)
MCHC RBC-ENTMCNC: 31 GM/DL — LOW (ref 32–36)
MCV RBC AUTO: 93 FL — SIGNIFICANT CHANGE UP (ref 80–100)
NRBC # BLD: 0 /100 WBCS — SIGNIFICANT CHANGE UP (ref 0–0)
PLATELET # BLD AUTO: 207 K/UL — SIGNIFICANT CHANGE UP (ref 150–400)
POTASSIUM SERPL-MCNC: 3.9 MMOL/L — SIGNIFICANT CHANGE UP (ref 3.5–5.3)
POTASSIUM SERPL-SCNC: 3.9 MMOL/L — SIGNIFICANT CHANGE UP (ref 3.5–5.3)
PROT SERPL-MCNC: 5.7 G/DL — LOW (ref 6–8.3)
RBC # BLD: 3.71 M/UL — LOW (ref 3.8–5.2)
RBC # FLD: 15 % — HIGH (ref 10.3–14.5)
SODIUM SERPL-SCNC: 145 MMOL/L — SIGNIFICANT CHANGE UP (ref 135–145)
SPECIMEN SOURCE: SIGNIFICANT CHANGE UP
TROPONIN I SERPL-MCNC: 0.32 NG/ML — HIGH (ref 0–0.04)
WBC # BLD: 6.45 K/UL — SIGNIFICANT CHANGE UP (ref 3.8–10.5)
WBC # FLD AUTO: 6.45 K/UL — SIGNIFICANT CHANGE UP (ref 3.8–10.5)

## 2019-04-25 PROCEDURE — 93306 TTE W/DOPPLER COMPLETE: CPT | Mod: 26

## 2019-04-25 PROCEDURE — 99233 SBSQ HOSP IP/OBS HIGH 50: CPT

## 2019-04-25 RX ORDER — LOSARTAN POTASSIUM 100 MG/1
50 TABLET, FILM COATED ORAL DAILY
Qty: 0 | Refills: 0 | Status: DISCONTINUED | OUTPATIENT
Start: 2019-04-25 | End: 2019-04-26

## 2019-04-25 RX ADMIN — HEPARIN SODIUM 5000 UNIT(S): 5000 INJECTION INTRAVENOUS; SUBCUTANEOUS at 05:41

## 2019-04-25 RX ADMIN — CEFTRIAXONE 100 GRAM(S): 500 INJECTION, POWDER, FOR SOLUTION INTRAMUSCULAR; INTRAVENOUS at 05:41

## 2019-04-25 RX ADMIN — HEPARIN SODIUM 5000 UNIT(S): 5000 INJECTION INTRAVENOUS; SUBCUTANEOUS at 13:36

## 2019-04-25 RX ADMIN — HEPARIN SODIUM 5000 UNIT(S): 5000 INJECTION INTRAVENOUS; SUBCUTANEOUS at 22:38

## 2019-04-25 NOTE — PROGRESS NOTE ADULT - SUBJECTIVE AND OBJECTIVE BOX
PGY1 Note discussed with Supervising Resident and Primary Attending.    Patient is a 89y old  Female who presents with a chief complaint of Tachycardia and hypotension at home (2019 12:48)      INTERVAL HPI/OVERNIGHT EVENTS :  No acute overnight events. Patient seen and examined at bedside. Patient has no current complaints. Patient denies nausea, vomiting, diarrhea, chest pain, SOB, headache.  MEDICATIONS  (STANDING):  cefTRIAXone   IVPB 1 Gram(s) IV Intermittent every 24 hours  heparin  Injectable 5000 Unit(s) SubCutaneous every 8 hours  sodium chloride 0.9%. 1000 milliLiter(s) (50 mL/Hr) IV Continuous <Continuous>    MEDICATIONS  (PRN):  artificial  tears Solution 1 Drop(s) Both EYES every 4 hours PRN dry eys      Allergies    No Known Allergies    Intolerances        REVIEW OF SYSTEMS :  * General: denies chills, fatigue  * Eyes: denies vision changes  * Respiratory: denies cough, SOB, wheezing  * Cardio: denies chest pain, palpitations  * GI: denies abd pain, nausea, vomiting, diarrhea  * : denies dysuria  * Neuro: denies headaches, numbness, weakness  * MSK: denies joint pain  * Skin: denies itching, rashes    Vital Signs Last 24 Hrs  T(C): 36.9 (2019 04:58), Max: 37 (2019 21:13)  T(F): 98.4 (2019 04:58), Max: 98.6 (2019 21:13)  HR: 84 (2019 04:58) (81 - 88)  BP: 144/69 (2019 04:58) (130/79 - 144/69)  BP(mean): --  RR: 18 (2019 04:58) (18 - 19)  SpO2: 97% (2019 04:58) (97% - 99%)    PHYSICAL EXAM :  * General: no acute distress, well-nourished, well-developed  * HEENT: atraumatic, normocephalic; moist mucus membranes; supple neck; no JVD  * CN: EOMI, PERRL  * Respiratory: cta b/l; no wheezes, rales, rhonchi  * Cardio: RRR; no appreciable murmurs, rubs, gallops  * Abd: soft, nontender, nondistended, +BS  * Neuro: AAOx3; strength 5/5; sensation intact throughout  * Extremities: no clubbing, cyanosis, edema  * Skin: no rashes    LABS:                          10.7   6.45  )-----------( 207      ( 2019 08:18 )             34.5     04-    145  |  117<H>  |  23<H>  ----------------------------<  91  3.9   |  22  |  2.21<H>    Ca    7.9<L>      2019 08:18  Phos  3.1     -  Mg     1.6     -    TPro  5.7<L>  /  Alb  2.5<L>  /  TBili  0.3  /  DBili  x   /  AST  20  /  ALT  8<L>  /  AlkPhos  60      PTT - ( 2019 21:22 )  PTT:22.1 sec  Urinalysis Basic - ( 2019 03:06 )    Color: Yellow / Appearance: Slightly Turbid / S.020 / pH: x  Gluc: x / Ketone: Negative  / Bili: Negative / Urobili: Negative   Blood: x / Protein: 30 mg/dL / Nitrite: Negative   Leuk Esterase: Small / RBC: 0-2 /HPF / WBC 3-5 /HPF   Sq Epi: x / Non Sq Epi: Few /HPF / Bacteria: Moderate /HPF      CAPILLARY BLOOD GLUCOSE      POCT Blood Glucose.: 93 mg/dL (2019 12:00)      RADIOLOGY & ADDITIONAL TESTS:   no new imaging    Imaging Personally Reviewed:    Consultant(s) Notes Reviewed: PGY1 Note discussed with Supervising Resident and Primary Attending.    Patient is a 89y old  Female who presents with a chief complaint of Tachycardia and hypotension at home (2019 12:48)      INTERVAL HPI/OVERNIGHT EVENTS:  No acute overnight events. Patient seen and examined at bedside. Patient appears comfortable. Planning for dc home with home care tomorrow after blood and urine cx come back.    MEDICATIONS  (STANDING):  cefTRIAXone   IVPB 1 Gram(s) IV Intermittent every 24 hours  heparin  Injectable 5000 Unit(s) SubCutaneous every 8 hours  sodium chloride 0.9%. 1000 milliLiter(s) (50 mL/Hr) IV Continuous <Continuous>    MEDICATIONS  (PRN):  artificial  tears Solution 1 Drop(s) Both EYES every 4 hours PRN dry eys      Allergies    No Known Allergies    Intolerances        REVIEW OF SYSTEMS: unable to elicit    Vital Signs Last 24 Hrs  T(C): 36.9 (2019 04:58), Max: 37 (2019 21:13)  T(F): 98.4 (2019 04:58), Max: 98.6 (2019 21:13)  HR: 84 (2019 04:58) (81 - 88)  BP: 144/69 (2019 04:58) (130/79 - 144/69)  BP(mean): --  RR: 18 (2019 04:58) (18 - 19)  SpO2: 97% (2019 04:58) (97% - 99%)    PHYSICAL EXAM:  * General: no acute distress, well-nourished, well-developed  * HEENT: atraumatic, normocephalic; moist mucus membranes; supple neck; no JVD  * CN: EOMI, PERRL  * Respiratory: cta b/l; no wheezes, rales, rhonchi  * Cardio: irregular; no appreciable murmurs, rubs, gallops  * Abd: soft, nontender, nondistended, +BS  * Neuro: bedbound, nonverbal  * Extremities: no clubbing, cyanosis, edema  * Skin: no rashes    LABS:                          10.7   6.45  )-----------( 207      ( 2019 08:18 )             34.5     04-25    145  |  117<H>  |  23<H>  ----------------------------<  91  3.9   |  22  |  2.21<H>    Ca    7.9<L>      2019 08:18  Phos  3.1     04-24  Mg     1.6     -24    TPro  5.7<L>  /  Alb  2.5<L>  /  TBili  0.3  /  DBili  x   /  AST  20  /  ALT  8<L>  /  AlkPhos  60  -    PTT - ( 2019 21:22 )  PTT:22.1 sec  Urinalysis Basic - ( 2019 03:06 )    Color: Yellow / Appearance: Slightly Turbid / S.020 / pH: x  Gluc: x / Ketone: Negative  / Bili: Negative / Urobili: Negative   Blood: x / Protein: 30 mg/dL / Nitrite: Negative   Leuk Esterase: Small / RBC: 0-2 /HPF / WBC 3-5 /HPF   Sq Epi: x / Non Sq Epi: Few /HPF / Bacteria: Moderate /HPF      CAPILLARY BLOOD GLUCOSE      POCT Blood Glucose.: 93 mg/dL (2019 12:00)      RADIOLOGY & ADDITIONAL TESTS:   no new imaging    Consultant(s) Notes Reviewed: yes

## 2019-04-25 NOTE — DIETITIAN INITIAL EVALUATION ADULT. - MD RECOMMEND
continue with Pureed diet, declined oral supplements , add multivitamin/minerals 1 tab/d and vitamin C 500mg bid to assist with healing if not contraindicated

## 2019-04-25 NOTE — PROGRESS NOTE ADULT - PROBLEM SELECTOR PLAN 4
Holding home Zetia and Plavix due to AMS and poor mental status  Consulted Dr Gaines Holding home Zetia and Plavix due to AMS and poor mental status  - Palliative Dr Gaines on the case

## 2019-04-25 NOTE — PROGRESS NOTE ADULT - PROBLEM SELECTOR PLAN 5
Improve VTE score: 2 (Heparin sq)  [ ] Previous VTE                                               3  [ ] Thrombophilia                                             2  [ ] Lower limb paralysis                                   2    [ ] Current Cancer                                           2   [x] Immobilization > 24 hrs                              1  [ ] ICU/CCU stay > 24 hours                            1  [x] Age > 60                                                       1 Improve VTE  [ ] Previous VTE                                               3  [ ] Thrombophilia                                             2  [ ] Lower limb paralysis                                   2    [ ] Current Cancer                                           2   [x] Immobilization > 24 hrs                              1  [ ] ICU/CCU stay > 24 hours                            1  [x] Age > 60                                                       1    Score 2  HSQ for VTE ppx.

## 2019-04-25 NOTE — DIETITIAN INITIAL EVALUATION ADULT. - PROBLEM SELECTOR PLAN 1
New onset AFib with HR in 140's  Demand troponin  s/p Cardizem 10mg in ED  Ordered 1 dose of 150mg Amiodarone  Aggressive hydration   HAS-BLED score of 5, not a candidate of anticoagulation  F/u infectious work up to r/o sepsis  Will start amio drip if HR doesn't improve  Consulted Dr Merino

## 2019-04-25 NOTE — PROGRESS NOTE ADULT - PROBLEM SELECTOR PLAN 2
Pt takes Micardis at home  Holding due to hypotension  Aggressive IV hydration and rate control On Micardis at home, held on admission due to hypotension  - Resolved  - Aggressive IV hydration and rate control  - Restart losartan in place of micardis as BP has returned to normal

## 2019-04-25 NOTE — DIETITIAN INITIAL EVALUATION ADULT. - PERTINENT LABORATORY DATA
04-25 Na145 mmol/L Glu 91 mg/dL K+ 3.9 mmol/L Cr  2.21 mg/dL<H> BUN 23 mg/dL<H> 04-24 Phos 3.1 mg/dL 04-25 Alb 2.5 g/dL<L> 04-24 [id number] 5.7 %<H> 04-24 Chol 201 mg/dL<H>  mg/dL HDL 38 mg/dL<L> Trig 97 mg/dL

## 2019-04-25 NOTE — DIETITIAN INITIAL EVALUATION ADULT. - OTHER INFO
Daughter reports weight loss PTA but amount not available. Daughter declined nutrition focus physical exam. Daughter reports weight loss PTA but amount not available. Daughter declined nutrition focus physical exam. Palliative consulted

## 2019-04-25 NOTE — PROGRESS NOTE ADULT - PROBLEM SELECTOR PLAN 1
New onset AFib with HR in 140's  Demand troponin  s/p Cardizem 10mg in ED  Ordered 1 dose of 150mg Amiodarone  Aggressive hydration   HAS-BLED score of 5, not a candidate of anticoagulation  F/u infectious work up to r/o sepsis  Will start amio drip if HR doesn't improve  Consulted Dr Merino New onset AFib with HR in 140s  - Want to r/o bacteremia as cause - afebrile, WBC wnl  - Trops elevated but trending down  - S/p Cardizem 10mg + Amiodarone 150mg  - Aggressive hydration   - HAS-BLED score of 5, not a candidate of anticoagulation  - CXR negative for pneumonia  - C/w ceftriaxone for now pending blood and urine cx  - F/u blood cx and urine cx to r/o infectious cause  - Cardio Dr Merino  - Dc planning pending blood and urine cx

## 2019-04-25 NOTE — PROGRESS NOTE ADULT - ATTENDING COMMENTS
Patient seen and examined.   89 Female with PMH of HTN, dementia, bedbound, TIA, HLD  admitted with hypotension in the setting of A.fib with rvr. s/p Amiodarone dose in the ed.     s/p Hypotension from A.fib with RVR- resolved s/p iv hydration.   A.fib with rvr s/p Amiodarone x 1 dose, rate controlled. Cards consulted. Patient not a candidate for Anticoagulation.  MIN resolving with hydration.   Acute Cystitis- UA positive, on Ceftriaxone, follow up urine cultures. If negative d/c abx.     GOC discussion by Palliative appreciated. Patient DNR/DNI.   Continue with Purree, patients daughter refusing tube feeds if patient fails speech swallow.  D/C planning once cultures negative. Patient seen and examined.   89 Female with PMH of HTN, dementia, bedbound, TIA, HLD  admitted with hypotension in the setting of A.fib with rvr. s/p Amiodarone dose in the ed.     s/p Hypotension from A.fib with RVR- resolved s/p iv hydration.   A.fib with rvr s/p Amiodarone x 1 dose, rate controlled. Cards consulted. Patient not a candidate for Anticoagulation.  MIN resolving with hydration.   Acute Cystitis- UA positive, on Ceftriaxone, d/c abx urine and blood cultures negative.     GOC discussion by Palliative appreciated. Patient DNR/DNI.   Continue with Purree, patients daughter refusing tube feeds if patient fails speech swallow.  D/C planning, time spent coordinating discharge plan 39 minutes.

## 2019-04-25 NOTE — PROGRESS NOTE ADULT - PROBLEM SELECTOR PLAN 3
Baseline Cr of 1.4  Pre-renal azotemia of 2.91  IV hydration and Monitor BMP Baseline Cr of 1.4, pre-renal azotemia of 2.91  - Improving  - IV hydration and Monitor BMP

## 2019-04-25 NOTE — CHART NOTE - NSCHARTNOTEFT_GEN_A_CORE
Upon Nutritional Assessment by the Registered Dietitian your patient was determined to meet criteria / has evidence of the following diagnosis/diagnoses:          [ ]  Mild Protein Calorie Malnutrition        [ ]  Moderate Protein Calorie Malnutrition        [x ] Severe Protein Calorie Malnutrition        [ ] Unspecified Protein Calorie Malnutrition        [ x] Underweight / BMI <19        [ ] Morbid Obesity / BMI > 40      Findings as based on:  •  Comprehensive nutrition assessment and consultation  •  Calorie counts (nutrient intake analysis)  •  Food acceptance and intake status from observations by staff  •  Follow up  •  Patient education  •  Intervention secondary to interdisciplinary rounds  •   concerns      Treatment:    The following diet has been recommended:      PROVIDER Section:     By signing this assessment you are acknowledging and agree with the diagnosis/diagnoses assigned by the Registered Dietitian    Comments:  continue with pureed diet, declined oral supplements , add multivitamin/minerals 1 tab/d and vitamin C 500mg bid to assist with healing if not contraindicated

## 2019-04-26 ENCOUNTER — TRANSCRIPTION ENCOUNTER (OUTPATIENT)
Age: 84
End: 2019-04-26

## 2019-04-26 VITALS
SYSTOLIC BLOOD PRESSURE: 146 MMHG | RESPIRATION RATE: 18 BRPM | DIASTOLIC BLOOD PRESSURE: 59 MMHG | OXYGEN SATURATION: 100 % | HEART RATE: 81 BPM | TEMPERATURE: 98 F | WEIGHT: 100.53 LBS

## 2019-04-26 LAB
ANION GAP SERPL CALC-SCNC: 6 MMOL/L — SIGNIFICANT CHANGE UP (ref 5–17)
BUN SERPL-MCNC: 18 MG/DL — SIGNIFICANT CHANGE UP (ref 7–18)
CALCIUM SERPL-MCNC: 7.6 MG/DL — LOW (ref 8.4–10.5)
CHLORIDE SERPL-SCNC: 116 MMOL/L — HIGH (ref 96–108)
CO2 SERPL-SCNC: 23 MMOL/L — SIGNIFICANT CHANGE UP (ref 22–31)
CREAT SERPL-MCNC: 1.85 MG/DL — HIGH (ref 0.5–1.3)
GLUCOSE SERPL-MCNC: 107 MG/DL — HIGH (ref 70–99)
HCT VFR BLD CALC: 32.4 % — LOW (ref 34.5–45)
HGB BLD-MCNC: 10.4 G/DL — LOW (ref 11.5–15.5)
MCHC RBC-ENTMCNC: 29.7 PG — SIGNIFICANT CHANGE UP (ref 27–34)
MCHC RBC-ENTMCNC: 32.1 GM/DL — SIGNIFICANT CHANGE UP (ref 32–36)
MCV RBC AUTO: 92.6 FL — SIGNIFICANT CHANGE UP (ref 80–100)
NRBC # BLD: 0 /100 WBCS — SIGNIFICANT CHANGE UP (ref 0–0)
PLATELET # BLD AUTO: 212 K/UL — SIGNIFICANT CHANGE UP (ref 150–400)
POTASSIUM SERPL-MCNC: 3.1 MMOL/L — LOW (ref 3.5–5.3)
POTASSIUM SERPL-SCNC: 3.1 MMOL/L — LOW (ref 3.5–5.3)
RBC # BLD: 3.5 M/UL — LOW (ref 3.8–5.2)
RBC # FLD: 15 % — HIGH (ref 10.3–14.5)
SODIUM SERPL-SCNC: 145 MMOL/L — SIGNIFICANT CHANGE UP (ref 135–145)
WBC # BLD: 6.31 K/UL — SIGNIFICANT CHANGE UP (ref 3.8–10.5)
WBC # FLD AUTO: 6.31 K/UL — SIGNIFICANT CHANGE UP (ref 3.8–10.5)

## 2019-04-26 PROCEDURE — 96374 THER/PROPH/DIAG INJ IV PUSH: CPT

## 2019-04-26 PROCEDURE — 93970 EXTREMITY STUDY: CPT

## 2019-04-26 PROCEDURE — 83735 ASSAY OF MAGNESIUM: CPT

## 2019-04-26 PROCEDURE — 84484 ASSAY OF TROPONIN QUANT: CPT

## 2019-04-26 PROCEDURE — 84145 PROCALCITONIN (PCT): CPT

## 2019-04-26 PROCEDURE — 82607 VITAMIN B-12: CPT

## 2019-04-26 PROCEDURE — 99239 HOSP IP/OBS DSCHRG MGMT >30: CPT

## 2019-04-26 PROCEDURE — 80053 COMPREHEN METABOLIC PANEL: CPT

## 2019-04-26 PROCEDURE — 84100 ASSAY OF PHOSPHORUS: CPT

## 2019-04-26 PROCEDURE — 99291 CRITICAL CARE FIRST HOUR: CPT | Mod: 25

## 2019-04-26 PROCEDURE — 87040 BLOOD CULTURE FOR BACTERIA: CPT

## 2019-04-26 PROCEDURE — 83605 ASSAY OF LACTIC ACID: CPT

## 2019-04-26 PROCEDURE — 82550 ASSAY OF CK (CPK): CPT

## 2019-04-26 PROCEDURE — 82553 CREATINE MB FRACTION: CPT

## 2019-04-26 PROCEDURE — 81001 URINALYSIS AUTO W/SCOPE: CPT

## 2019-04-26 PROCEDURE — 82746 ASSAY OF FOLIC ACID SERUM: CPT

## 2019-04-26 PROCEDURE — 80048 BASIC METABOLIC PNL TOTAL CA: CPT

## 2019-04-26 PROCEDURE — 87086 URINE CULTURE/COLONY COUNT: CPT

## 2019-04-26 PROCEDURE — 36415 COLL VENOUS BLD VENIPUNCTURE: CPT

## 2019-04-26 PROCEDURE — 82962 GLUCOSE BLOOD TEST: CPT

## 2019-04-26 PROCEDURE — 84436 ASSAY OF TOTAL THYROXINE: CPT

## 2019-04-26 PROCEDURE — 85027 COMPLETE CBC AUTOMATED: CPT

## 2019-04-26 PROCEDURE — 80061 LIPID PANEL: CPT

## 2019-04-26 PROCEDURE — 93005 ELECTROCARDIOGRAM TRACING: CPT

## 2019-04-26 PROCEDURE — 93306 TTE W/DOPPLER COMPLETE: CPT

## 2019-04-26 PROCEDURE — 83880 ASSAY OF NATRIURETIC PEPTIDE: CPT

## 2019-04-26 PROCEDURE — 85730 THROMBOPLASTIN TIME PARTIAL: CPT

## 2019-04-26 PROCEDURE — 71045 X-RAY EXAM CHEST 1 VIEW: CPT

## 2019-04-26 PROCEDURE — 83036 HEMOGLOBIN GLYCOSYLATED A1C: CPT

## 2019-04-26 PROCEDURE — 96375 TX/PRO/DX INJ NEW DRUG ADDON: CPT

## 2019-04-26 PROCEDURE — 92610 EVALUATE SWALLOWING FUNCTION: CPT

## 2019-04-26 PROCEDURE — 84443 ASSAY THYROID STIM HORMONE: CPT

## 2019-04-26 PROCEDURE — 84300 ASSAY OF URINE SODIUM: CPT

## 2019-04-26 RX ORDER — POTASSIUM CHLORIDE 20 MEQ
40 PACKET (EA) ORAL EVERY 4 HOURS
Qty: 0 | Refills: 0 | Status: DISCONTINUED | OUTPATIENT
Start: 2019-04-26 | End: 2019-04-26

## 2019-04-26 RX ORDER — CLOPIDOGREL BISULFATE 75 MG/1
1 TABLET, FILM COATED ORAL
Qty: 0 | Refills: 0 | COMMUNITY

## 2019-04-26 RX ORDER — CLOPIDOGREL BISULFATE 75 MG/1
1 TABLET, FILM COATED ORAL
Qty: 0 | Refills: 0 | COMMUNITY
Start: 2019-04-26

## 2019-04-26 RX ORDER — METOPROLOL TARTRATE 50 MG
0.5 TABLET ORAL
Qty: 30 | Refills: 0 | OUTPATIENT
Start: 2019-04-26 | End: 2019-05-25

## 2019-04-26 RX ORDER — ASPIRIN/CALCIUM CARB/MAGNESIUM 324 MG
1 TABLET ORAL
Qty: 30 | Refills: 0 | OUTPATIENT
Start: 2019-04-26 | End: 2019-05-25

## 2019-04-26 RX ADMIN — HEPARIN SODIUM 5000 UNIT(S): 5000 INJECTION INTRAVENOUS; SUBCUTANEOUS at 13:21

## 2019-04-26 RX ADMIN — Medication 40 MILLIEQUIVALENT(S): at 13:20

## 2019-04-26 RX ADMIN — CEFTRIAXONE 100 GRAM(S): 500 INJECTION, POWDER, FOR SOLUTION INTRAMUSCULAR; INTRAVENOUS at 05:51

## 2019-04-26 NOTE — DISCHARGE NOTE NURSING/CASE MANAGEMENT/SOCIAL WORK - NSDCPEPTSTRK_GEN_ALL_CORE
Signs and symptoms of stroke/Call 911 for stroke/Need for follow up after discharge/Stroke education booklet/Stroke support groups for patients, families, and friends/Stroke warning signs and symptoms/Prescribed medications/Risk factors for stroke

## 2019-04-26 NOTE — ADVANCED PRACTICE NURSE CONSULT - RECOMMEDATIONS
-Clean all wounds with normal saline and apply skin prep to the surrounding skin  -Apply TRIAD Moisture Barrier Cream to the Bilateral Gluteus and Coccyx areas b.i.d. PRN  -Elevate/float the patients heels using heel protectors and reposition the patient Q 2hrs using wedges or pillows

## 2019-04-26 NOTE — PROGRESS NOTE ADULT - SUBJECTIVE AND OBJECTIVE BOX
Patient is a 89y old  Female who presents with a chief complaint of Tachycardia and hypotension at home (26 Apr 2019 10:37)    HPI:  89 Female with PMH of HTN, dementia, bedbound, TIA, HLD was brought to hospital for increased heart rate and low blood pressure at home as checked by daughter. Pt is bedbound and nonverbal at baseline and is dependent on HHA for daily activities. She denied having any symptoms or complaints. Daughter denied having any cough, fever, chest pain, problems with urine or bowel.  ED Course: HR of 105 and BP was 96/55. Labs showed hemoconcentration and MIN on CKD. Cardiac enzymes were elevated to 0.831. EKG showed new onset Afib with RVR. Pt was given 1 lit bolus, rocephine, zithromax and 10 cardizem. (23 Apr 2019 23:38)    Today:  Pt awaiting discharge home with daughter. Pt sp trejo removal. Pt noted to have wet her clothes and bed on exam. Pt denies any complaints.     PAST MEDICAL & SURGICAL HISTORY:  TIA (transient ischemic attack)  Hypercholesterolemia  HTN (hypertension)  No significant past surgical history    MEDICATIONS  (STANDING):  cefTRIAXone   IVPB 1 Gram(s) IV Intermittent every 24 hours  heparin  Injectable 5000 Unit(s) SubCutaneous every 8 hours  losartan 50 milliGRAM(s) Oral daily  potassium chloride   Powder 40 milliEquivalent(s) Oral every 4 hours  sodium chloride 0.9%. 1000 milliLiter(s) (50 mL/Hr) IV Continuous <Continuous>    MEDICATIONS  (PRN):  artificial  tears Solution 1 Drop(s) Both EYES every 4 hours PRN dry eys    EXAM:  Vital Signs Last 24 Hrs  T(C): 36.6 (26 Apr 2019 05:15), Max: 36.8 (25 Apr 2019 21:14)  T(F): 97.8 (26 Apr 2019 05:15), Max: 98.2 (25 Apr 2019 21:14)  HR: 81 (26 Apr 2019 05:15) (81 - 92)  BP: 146/59 (26 Apr 2019 05:15) (129/51 - 150/79)  BP(mean): --  RR: 18 (26 Apr 2019 05:15) (17 - 18)  SpO2: 100% (26 Apr 2019 05:15) (98% - 100%)    04-25 @ 07:01  -  04-26 @ 07:00  --------------------------------------------------------  IN: 0 mL / OUT: 920 mL / NET: -920 mL    PHYSICAL EXAM:  Constitutional: awake in bed.   ENMT: patent nares, moist mucus membranes  Neck: supple  Genitourinary: voided on bed.   Neurological: alert  Skin: intact no rash, warm to touch.     LABS:  LIVER FUNCTIONS - ( 25 Apr 2019 08:18 )  Alb: 2.5 g/dL / Pro: 5.7 g/dL / ALK PHOS: 60 U/L / ALT: 8 U/L DA / AST: 20 U/L / GGT: x                               10.4   6.31  )-----------( 212      ( 26 Apr 2019 07:14 )             32.4   04-26  145  |  116<H>  |  18  ----------------------------<  107<H>  3.1<L>   |  23  |  1.85<H>  Ca    7.6<L>      26 Apr 2019 07:14  TPro  5.7<L>  /  Alb  2.5<L>  /  TBili  0.3  /  DBili  x   /  AST  20  /  ALT  8<L>  /  AlkPhos  60  04-25  CARDIAC MARKERS ( 25 Apr 2019 08:18 )  0.317 ng/mL / x     / 43 U/L / x     / 3.4 ng/mL    Chart reviewed.   Labs reviewed.  Imaging reviewed.   Plan discussed with consultants.

## 2019-04-26 NOTE — DISCHARGE NOTE NURSING/CASE MANAGEMENT/SOCIAL WORK - NSDCDPATPORTLINK_GEN_ALL_CORE
You can access the [facility] Portal, offered by [facility], by registering with the following website: [email or web address]

## 2019-04-26 NOTE — DISCHARGE NOTE PROVIDER - NSDCCPCAREPLAN_GEN_ALL_CORE_FT
PRINCIPAL DISCHARGE DIAGNOSIS  Diagnosis: Atrial fibrillation with RVR  Assessment and Plan of Treatment: You presented with weakness and were found to have atrial fibrillation with rapid ventricular response. You were treated with IV antibiotics in case of infection. Infectious workup was negative so no need for further antibiotics. You received amiodarone and cardizem in ED and were put on tele. You also received IV fluids. Your heart rate has been controlled since. You are medically stable for discharge home with no new medications. Follow up with your primary doctor within 1-2 weeks.      SECONDARY DISCHARGE DIAGNOSES  Diagnosis: Severe protein-calorie malnutrition  Assessment and Plan of Treatment: You were found to have severe protein calorie malnutrition with an albumin of 2.5 (recommended 4.0). Please continue with the diet recommended to you in this discharge. Oral intake is encouraged, particularly of fluids, to prevent dehydration.    Diagnosis: Acute on chronic kidney failure  Assessment and Plan of Treatment: You had acute on chronic kidney failure likely due to dehydration. You received IV fluids and your kidney function improved back to baseline. PRINCIPAL DISCHARGE DIAGNOSIS  Diagnosis: Atrial fibrillation with RVR  Assessment and Plan of Treatment: You presented with weakness and were found to have atrial fibrillation with rapid ventricular response. You were treated with IV antibiotics in case of infection. Infectious workup was negative so no need for further antibiotics. You received amiodarone and cardizem in ED and were put on tele. You also received IV fluids in case of dehydration. Your heart rate has been controlled since, and you returned to normal sinus rhythm. You are medically stable for discharge home with metoprolol 12.5mg twice a day, and aspirin in place of your plavix, as you are not a good candidate for anticoagulant medications due to bleeding risk. Follow up with your primary doctor within 1-2 weeks.      SECONDARY DISCHARGE DIAGNOSES  Diagnosis: Severe protein-calorie malnutrition  Assessment and Plan of Treatment: You were found to have severe protein calorie malnutrition with an albumin of 2.5 (recommended 4.0). Please continue with the diet recommended to you in this discharge. Oral intake is encouraged, particularly of fluids, to prevent dehydration.    Diagnosis: Acute on chronic kidney failure  Assessment and Plan of Treatment: You had acute on chronic kidney failure likely due to dehydration. You received IV fluids and your kidney function improved back to baseline. Please follow up with your primary doctor. It is important to increase oral intake particularly of fluids. PRINCIPAL DISCHARGE DIAGNOSIS  Diagnosis: Atrial fibrillation with RVR  Assessment and Plan of Treatment: You presented with weakness and were found to have atrial fibrillation with rapid ventricular response. You were treated with IV antibiotics in case of infection. Infectious workup was negative so no need for further antibiotics. You received amiodarone and cardizem in ED and were put on tele. You also received IV fluids in case of dehydration. Your heart rate has been controlled since, and you returned to normal sinus rhythm. You are medically stable for discharge home with metoprolol 12.5mg twice a day and plavix. Follow up with your primary doctor within 1-2 weeks.      SECONDARY DISCHARGE DIAGNOSES  Diagnosis: Severe protein-calorie malnutrition  Assessment and Plan of Treatment: You were found to have severe protein calorie malnutrition with an albumin of 2.5 (recommended 4.0). Please continue with the diet recommended to you in this discharge. Oral intake is encouraged, particularly of fluids, to prevent dehydration.    Diagnosis: Acute on chronic kidney failure  Assessment and Plan of Treatment: You had acute on chronic kidney failure likely due to dehydration. You received IV fluids and your kidney function improved back to baseline. Please follow up with your primary doctor. It is important to increase oral intake particularly of fluids.

## 2019-04-26 NOTE — PROGRESS NOTE ADULT - ASSESSMENT
89 Female with PMH of HTN, dementia, bedbound, TIA, HLD was brought to hospital for increased heart rate and low blood pressure at home. Admitted to [facility] for new onset Afib with RVR.     - Atrial fibrillation with rapid ventricular response.    Plan: New onset AFib with HR in 140s. Now in NSR.   Cardio noted. To start on metoprolol 12.5 bid   Not a candidate for AC due to HAS-BLED score of 5.   - S/p Cardizem 10mg + Amiodarone 150mg    -Infectious source ruled out:   Pt empirically started on Rocephin on admission. Will dc abx.   afebrile, WBC wnl  CXR negative for pneumonia  F/u blood cx and urine cx negative.     - Trops elevated   trending down likely secondary to afib with RVR  Cardio Dr Merino noted  Echo: grade 2 diastolic dysfunction.     -Hypotension.   resolved after hydration.   restarted on losartan. Resume Micardis on dc.      - Acute on chronic kidney failure.    Plan: Baseline Cr of 1.4, pre-renal azotemia of 2.91  -sp IV hydration    - TIA (transient ischemic attack).   Plan: pt changed from Plavix  to asa 81mg  Will resume Plavix on dc. Pt to follow up with PCP.     -Need for prophylactic measure.  Plan: Improve VTE  [ ] Previous VTE                                               3  [ ] Thrombophilia                                             2  [ ] Lower limb paralysis                                   2    [ ] Current Cancer                                           2   [x] Immobilization > 24 hrs                              1  [ ] ICU/CCU stay > 24 hours                            1  [x] Age > 60                                                       1  Score 2  HSQ for VTE ppx.     GOC discussion by Palliative appreciated. Patient DNR/DNI.   Continue with puree diet. Patients daughter refusing tube feeds.   D/C home today.

## 2019-04-26 NOTE — DISCHARGE NOTE PROVIDER - HOSPITAL COURSE
HPI:    89 Female with PMH of HTN, dementia, bedbound, TIA, HLD was brought to hospital for increased heart rate and low blood pressure at home as checked by daughter. Pt is bedbound and nonverbal at baseline and is dependent on HHA for daily activities. She denied having any symptoms or complaints. Daughter denied having any cough, fever, chest pain, problems with urine or bowel.        ED Course: HR of 105 and BP was 96/55. Labs showed hemoconcentration and MIN on CKD. Cardiac enzymes were elevated to 0.831. EKG showed new onset Afib with RVR. Pt was given 1 lit bolus, rocephine, zithromax and 10 cardizem. (23 Apr 2019 23:38)        Hospital Course: Palliative consulted, daughter not interested in hospice at this time. Received IVF, IV abx, amio 1 dose, and cardizem 1 dose. Admitted to tele. CXR was negative for pneumonia. Started ctx in case of infection. Blood and urine cx negative, abx d/c. After administration of meds, HR has been controlled. Medically stable for dc home as per attending Dr Stauffer. Plan was d/w Dr Stauffer and covering attending Dr Rock. No new meds for dc. Please see chart for full hospital course as this is just a brief summary. HPI:    89 Female with PMH of HTN, dementia, bedbound, TIA, HLD was brought to hospital for increased heart rate and low blood pressure at home as checked by daughter. Pt is bedbound and nonverbal at baseline and is dependent on HHA for daily activities. She denied having any symptoms or complaints. Daughter denied having any cough, fever, chest pain, problems with urine or bowel.        ED Course: HR of 105 and BP was 96/55. Labs showed hemoconcentration and MIN on CKD. Cardiac enzymes were elevated to 0.831. EKG showed new onset Afib with RVR. Pt was given 1 lit bolus, rocephine, zithromax and 10 cardizem. (23 Apr 2019 23:38)        Hospital Course: Palliative consulted, daughter not interested in hospice at this time. Received IVF, IV abx, amio 1 dose, and cardizem 1 dose. Admitted to tele. CXR was negative for pneumonia. Started ctx in case of infection. Blood and urine cx negative, abx d/c. After administration of meds, HR has been controlled and converted to sinus. Medically stable for dc home as per attending Dr Stauffer. Plan was d/w Dr Stauffer and covering attending Dr Rock. Cardio Dr Merino recommends dc on aspirin and low dose lopressor 12.5 bid as patient is not candidate for AC. Please see chart for full hospital course as this is just a brief summary.

## 2019-12-18 NOTE — PROGRESS NOTE ADULT - ASSESSMENT
Patient:   ANTHONY CRAWFORD            MRN: [id number]            FIN: [id number]              Age:   60 years     Sex:  MALE     :  59   Associated Diagnoses:   None   Author:   FATOU ANTONIO     Subjective   Chief complaint +cough and R shoulder pain. Denies fever.       Health Status   Current medications:    Medications (11) Active  Scheduled: (4)  Albuterol-ipratropium 2.5-0.5 mg/3 mL nebulizer soln  3 mL, Nebulizer, Q4H  Heparin 5,000 unit/1 mL inj  5,000 unit 1 mL, Subcutaneous, Q12H  MethylPREDNISolone Na succ PF 40 mg/1 mL inj SDV  40 mg 1 mL, Slow IV Push, Q8H  piperacillin-tazobactam  3.375 gm, IVPB, Q8H (variable)  Continuous: (0)  PRN: (7)  Acetaminophen 500 mg tab  500 mg 1 tab, Oral, Q6H  DiphenhydrAMINE 25 mg cap  25 mg 1 cap, Oral, Q Bedtime  Guaifenesin--20 mg/10 mL oral liquid UD  10 mL, Oral, Q6H  HydrALAZINE 20 mg/1 mL inj SDV  10 mg 0.5 mL, Slow IV Push, Q6H  Hydrocodone-acetaminophen 5-325 mg tab  1 tab, Oral, Q8H  Ondansetron 4 mg/2 mL inj SDV  4 mg 2 mL, Slow IV Push, Q6H  Senna-docusate sodium 8.6-50 mg tab  1 tab, Oral, Daily      Objective   VS/Measurements     Vitals between:   17-DEC-2019 14:29:12   TO   18-DEC-2019 14:29:12                   LAST RESULT MINIMUM MAXIMUM  Temperature 36.9 36.6 37.2  Heart Rate 103 73 108  Respiratory Rate 18 17 22  NISBP           129 126 152  NIDBP           74 74 84  NIMBP           101 101 101  SpO2                    99 95 100    Eye:  Pupils are equal, round and reactive to light, Extraocular movements are intact.   HENT:  Normocephalic.    Neck:  Supple.    Cardiovascular:  Normal rate, Regular rhythm, No murmur, No gallop.   Gastrointestinal:  Soft, Non-tender, Non-distended, Normal bowel sounds, No organomegaly.   Musculoskeletal:  No swelling, (R) shoulder swelling - no change + tender. No overlying erythema.   Neurologic:  No focal deficits.      Results Review   General results   Interpretation:   CBC  WBC: 6.4  THOUSAND/mcL (12/18/19 06:44:37)  Hemoglobin: 12.5 gm/dL Low (12/18/19 06:44:37)  Hematocrit: 37.3 % Low (12/18/19 06:44:37)  Platelet: 414 THOUSAND/mcL (12/18/19 06:44:37)  MCV: 92.3 fL (12/18/19 06:44:37)  RDW-CV: 11.9 % (12/18/19 06:44:37)  Chemistry  Sodium: 137 mmol/L (12/18/19 07:33:40)  Chloride: 103 mmol/L (12/18/19 07:33:40)  BUN: 21 mg/dL High (12/18/19 07:33:40)  Glucose Level: 144 mg/dL High (12/18/19 07:33:40)  Potassium: 4.3 mmol/L (12/18/19 07:33:40)  Carbon Dioxide (CO2): 24 mmol/L (12/18/19 07:33:40)  Creatinine: 1 mg/dL (12/18/19 07:33:40)  Calcium: 9.3 mg/dL (12/18/19 07:33:40)  Magnesium: 2.1 mg/dL (12/18/19 07:33:41)  No qualifying data available.       Impression and Plan   Assessment and Plan:       Diagnosis:   1. Nodular cavitary lesion. Neoplasm versus infectious etiologies including mycobacterial, fungal, or bacterial infection and lung abscess.  2. Right shoulder swelling, post influenza vaccination - R/O hematoma vs. abscess vs septic arthritis  3. Weight loss.  REC:  1. Cont. zosyn  2. Awaiting MRI of the R shoulder  3. Consider ortho eval.   89y F with MIN in the setting of sepsis/UTI with hypotension, also s/p IV contrast and possible contrast-induced nephropathy.  Kidney cyst of no consequence given the benign appearance and age of patient.

## 2020-01-24 NOTE — ED ADULT NURSE NOTE - ED STAT RN HANDOFF DETAILS 2
received  pt.  from  regina pt.  is  awake  and  responsive x1. pt. restless,attempt to get of . bed,medicated  with  benadyrl 25 mg IVP. pt. sleeping comfortably in bed.transfer to  414  report given to aury rossi.not  in  dress Albendazole Pregnancy And Lactation Text: This medication is Pregnancy Category C and it isn't known if it is safe during pregnancy. It is also excreted in breast milk.

## 2020-10-08 NOTE — DIETITIAN INITIAL EVALUATION ADULT. - PROBLEM/PLAN-3
Spine appears normal, range of motion is not limited, no muscle or joint tenderness DISPLAY PLAN FREE TEXT

## 2021-05-22 NOTE — ED ADULT NURSE NOTE - OBJECTIVE STATEMENT
Problem: Falls - Risk of  Goal: *Absence of Falls  Description: Document Jaya Shove Fall Risk and appropriate interventions in the flowsheet. Outcome: Progressing Towards Goal  Note: Fall Risk Interventions:  Mobility Interventions: Bed/chair exit alarm, Communicate number of staff needed for ambulation/transfer, Patient to call before getting OOB    Mentation Interventions: Adequate sleep, hydration, pain control, Bed/chair exit alarm, Door open when patient unattended, Family/sitter at bedside, More frequent rounding, Reorient patient    Medication Interventions: Bed/chair exit alarm, Evaluate medications/consider consulting pharmacy, Patient to call before getting OOB, Teach patient to arise slowly    Elimination Interventions: Bed/chair exit alarm, Call light in reach, Patient to call for help with toileting needs, Toileting schedule/hourly rounds    History of Falls Interventions: Bed/chair exit alarm, Door open when patient unattended         Problem: Patient Education: Go to Patient Education Activity  Goal: Patient/Family Education  Outcome: Progressing Towards Goal     Problem: Pressure Injury - Risk of  Goal: *Prevention of pressure injury  Description: Document Chevy Scale and appropriate interventions in the flowsheet.   Outcome: Progressing Towards Goal  Note: Pressure Injury Interventions:       Moisture Interventions: Absorbent underpads, Apply protective barrier, creams and emollients, Check for incontinence Q2 hours and as needed, Limit adult briefs, Maintain skin hydration (lotion/cream), Minimize layers    Activity Interventions: Increase time out of bed, Pressure redistribution bed/mattress(bed type), PT/OT evaluation    Mobility Interventions: HOB 30 degrees or less, Pressure redistribution bed/mattress (bed type), PT/OT evaluation    Nutrition Interventions: Document food/fluid/supplement intake, Offer support with meals,snacks and hydration    Friction and Shear Interventions: Apply protective barrier, creams and emollients, HOB 30 degrees or less, Minimize layers                Problem: Patient Education: Go to Patient Education Activity  Goal: Patient/Family Education  Outcome: Progressing Towards Goal     Problem: Patient Education: Go to Patient Education Activity  Goal: Patient/Family Education  Outcome: Progressing Towards Goal     Problem: Patient Education: Go to Patient Education Activity  Goal: Patient/Family Education  Outcome: Progressing Towards Goal pt came in w c/o palpitation (high HR), diaphoresis and pale appearance as per the patients daughter. pt is baseline dementia, non diaphoretic at present, breathing unlabored & moving all extremities. Pt is accompanied by family at bedside.

## 2021-09-07 NOTE — PROGRESS NOTE ADULT - SUBJECTIVE AND OBJECTIVE BOX
PGY 1 Note discussed with supervising resident and primary attending    Patient is a 89y old  Female who presents with a chief complaint of chills and fever (13 May 2018 00:10)      INTERVAL HPI/OVERNIGHT EVENTS: offers no new complaints; current symptoms resolving    MEDICATIONS  (STANDING):  ALBUTerol/ipratropium for Nebulization 3 milliLiter(s) Nebulizer every 6 hours  docusate sodium 100 milliGRAM(s) Oral two times a day  heparin  Injectable 5000 Unit(s) SubCutaneous every 12 hours  multivitamin/minerals 1 Tablet(s) Oral daily  pantoprazole    Tablet 40 milliGRAM(s) Oral before breakfast  piperacillin/tazobactam IVPB. 3.375 Gram(s) IV Intermittent every 8 hours  polyethylene glycol 3350 17 Gram(s) Oral daily  senna 2 Tablet(s) Oral at bedtime  simvastatin 10 milliGRAM(s) Oral at bedtime  sodium chloride 0.9%. 1000 milliLiter(s) (50 mL/Hr) IV Continuous <Continuous>    MEDICATIONS  (PRN):  acetaminophen   Tablet 650 milliGRAM(s) Oral every 6 hours PRN For Temp greater than 38 C (100.4 F)  acetaminophen   Tablet. 650 milliGRAM(s) Oral every 6 hours PRN Mild Pain (1 - 3)      __________________________________________________  REVIEW OF SYSTEMS:    CONSTITUTIONAL: No fever,   EYES: no acute visual disturbances  NECK: No pain or stiffness  RESPIRATORY: No cough; No shortness of breath  CARDIOVASCULAR: No chest pain, no palpitations  GASTROINTESTINAL: No pain. No nausea or vomiting; No diarrhea   NEUROLOGICAL: No headache or numbness, no tremors  MUSCULOSKELETAL: No joint pain, no muscle pain  GENITOURINARY: no dysuria, no frequency, no hesitancy  PSYCHIATRY: no depression , no anxiety  ALL OTHER  ROS negative        Vital Signs Last 24 Hrs  T(C): 36.7 (15 May 2018 23:50), Max: 36.7 (15 May 2018 23:50)  T(F): 98.1 (15 May 2018 23:50), Max: 98.1 (15 May 2018 23:50)  HR: 95 (15 May 2018 23:50) (95 - 108)  BP: 121/65 (15 May 2018 23:50) (110/57 - 121/65)  BP(mean): --  RR: 17 (15 May 2018 23:50) (16 - 17)  SpO2: 95% (15 May 2018 23:50) (95% - 95%)    ________________________________________________  PHYSICAL EXAM:  GENERAL: NAD  HEENT: Normocephalic;  conjunctivae and sclerae clear; moist mucous membranes;   NECK : supple  CHEST/LUNG: Reduced breath sounds in Right lower lobe    HEART: S1 S2  regular; no murmurs, gallops or rubs  ABDOMEN: Soft, Nontender, Nondistended; Bowel sounds present  EXTREMITIES: no cyanosis; no edema; no calf tenderness  SKIN: a Stage 1 Pressure Injury to the L. Elbow and Bilateral Heels; presenting as non-blanchable erythema  NERVOUS SYSTEM:  AAO*1     _________________________________________________  LABS:                        8.9    6.7   )-----------( 164      ( 16 May 2018 07:00 )             29.0     05-16    141  |  111<H>  |  12  ----------------------------<  89  3.7   |  25  |  1.47<H>    Ca    8.2<L>      16 May 2018 07:00  Phos  3.4     05-16  Mg     2.0     05-16            Consultant(s) Notes Reviewed:   YES    Care Discussed with Consultants : YES    Plan of care was discussed with patient and /or primary care giver; all questions and concerns were addressed and care was aligned with patient's wishes. Cyclosporine Counseling:  I discussed with the patient the risks of cyclosporine including but not limited to hypertension, gingival hyperplasia,myelosuppression, immunosuppression, liver damage, kidney damage, neurotoxicity, lymphoma, and serious infections. The patient understands that monitoring is required including baseline blood pressure, CBC, CMP, lipid panel and uric acid, and then 1-2 times monthly CMP and blood pressure.

## 2022-03-07 NOTE — PROGRESS NOTE ADULT - PROBLEM SELECTOR PLAN 4
Likely from UTI  Bladder scan shows : 400 cc retention   Will place trejo
no further w/u necessary
Likely from Constipation   Bladder scan shows : 400 cc retention   Continue with trejo  Start with bowel regimen
Likely from Constipation   Bladder scan shows : 400 cc retention   Continue with trejo  Start with bowel regimen
no further w/u necessary
36 yo female with breast cancer, complaining of abdominal pain and fevers for a week. Pt states she cannot tolerate PO intake.

## 2022-04-15 NOTE — PATIENT PROFILE ADULT - CENTRAL VENOUS CATHETER/PICC LINE
Airway    Date/Time: 4/15/2022 12:40 PM  Performed by: Husam Olvera M.D.  Authorized by: Husam Olvera M.D.     Location:  OR  Urgency:  Elective  Indications for Airway Management:  Anesthesia      Spontaneous Ventilation: absent    Sedation Level:  Deep  Preoxygenated: Yes    Final Airway Type:  Supraglottic airway  Final Supraglottic Airway:  Standard LMA    SGA Size:  4  Number of Attempts at Approach:  1   Atraumatic insertion, good seal           no

## 2023-03-31 NOTE — ED ADULT NURSE NOTE - RESPIRATORY WDL
31-Mar-2023 04:50 Breathing spontaneous and unlabored. Breath sounds clear and equal bilaterally with regular rhythm.

## 2023-12-02 NOTE — INPATIENT CERTIFICATION FOR MEDICARE PATIENTS - THE SEVERITY OF SIGNS/SYMPTOMS. (SEE ED/ADMIT DOCUMENTS)
Patient stable with no complaints at this time. Patient has been ambulatory to bathroom today with standby assist.  Patient continues to have slight cough throughout the day. Patient denies any pain and appears comfortable at this time. Patient relaxing in bed watching TV. Call light within reach and patient instructed to call if assistance is needed.   Report to be given to oncoming RN 7p-7a 1. The severity of signs/symptoms.(See ED/admit documents)

## 2024-02-19 NOTE — PATIENT PROFILE ADULT - FUNCTIONAL SCREEN CURRENT LEVEL: DRESSING, MLM
NEUROSURGERY- NEW PREVISIT PLANNING       Record Status/Location     Referring Provider  Dr. 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

## 2024-09-18 NOTE — DISCHARGE NOTE NURSING/CASE MANAGEMENT/SOCIAL WORK - NSDCPETBCESMAN_GEN_ALL_CORE
Problem: Adult Inpatient Plan of Care  Goal: Plan of Care Review  Outcome: Met  Goal: Patient-Specific Goal (Individualized)  Outcome: Met  Goal: Absence of Hospital-Acquired Illness or Injury  Outcome: Met  Goal: Optimal Comfort and Wellbeing  Outcome: Met  Goal: Readiness for Transition of Care  Outcome: Met     Problem: Stroke, Ischemic (Includes Transient Ischemic Attack)  Goal: Optimal Coping  Outcome: Met  Goal: Effective Bowel Elimination  Outcome: Met  Goal: Optimal Cerebral Tissue Perfusion  Outcome: Met  Goal: Optimal Cognitive Function  Outcome: Met  Goal: Improved Communication Skills  Outcome: Met  Goal: Optimal Functional Ability  Outcome: Met  Goal: Optimal Nutrition Intake  Outcome: Met  Goal: Effective Oxygenation and Ventilation  Outcome: Met  Goal: Improved Sensorimotor Function  Outcome: Met  Goal: Safe and Effective Swallow  Outcome: Met  Goal: Effective Urinary Elimination  Outcome: Met     
No answer when cart was called.   [facility] informed.     Mylene Peres MD  Vascular Neurology        
[facility] - Telemetry (Hospital)  Discharge Assessment    Primary Care Provider: No, Primary Doctor     Discharge Assessment (most recent)       BRIEF DISCHARGE ASSESSMENT - 09/18/24 8899          Discharge Planning    Assessment Type Discharge Planning Brief Assessment     Resource/Environmental Concerns none     Support Systems Family members;Spouse/significant other     Equipment Currently Used at Home none     Current Living Arrangements home     Patient/Family Anticipates Transition to home;home with family;home with help/services     Patient/Family Anticipated Services at Transition outpatient care     DME Needed Upon Discharge  none     Discharge Plan A Home with family;Home                   SW met with patient and spouse at bedside to complete assessment. Pt normally lives at home with spouse and two minor children. Pt normally independnet with ADLs and does not use/own DME. SW discussed recommendations for outpatient therapy. Pt agreeable and reports attending services in the past. Pt wishes to resume treatment with same clinic. SW contacted [facility] ([facility]) and confirmed pt was a previous patient. Fax number obtained for referral; referral faxed and indicated DC today. AVS updated.     Pt's whiteboard updated with CM contact and anticipated discharge disposition. SW to remain available as needed.           
[facility] - Telemetry (Hospital)  Discharge Final Note    Primary Care Provider: No, Primary Doctor    Expected Discharge Date: 9/18/2024    Final Discharge Note (most recent)       Final Note - 09/18/24 1258          Final Note    Assessment Type Final Discharge Note     Anticipated Discharge Disposition Home or Self Care     Hospital Resources/Appts/Education Provided Community resources provided;Post-Acute resouces added to AVS        Post-Acute Status    Discharge Delays None known at this time                   Pt being discharge home today. Outpt therapy referral faxed to [facility] ([place]). AVS updated with instructions for follow up.   Pt to arrange non-[facility] PCP in [place], [place].      Important Message from Medicare             Contact Info       No, Primary Doctor   Relationship: PCP - General        Next Steps: Follow up in 3 day(s)    [facility]   Specialty: Neurology    [address]  [place] [address]   Phone: [phone]       Next Steps: Follow up in 2 week(s)    [facility] [place]    [address] [place] [address] [address] [place] [address]   Phone: [phone]       Next Steps: Follow up    Instructions: REFERRAL FAXED TO CLINIC    PLEASE CALL TO FOLLOW UP ON REFERRAL IF YOU ARE NOT CONTACTED WITHIN A WEEK OF DISCHARGE.    [facility]        Next Steps: Follow up    Instructions: tel:[phone]            
Patient called asking if we can follow up with [facility]. They keep on telling her that they have not received a refill request for Novolog but she knows we sent it.   
Recommending low intensity therapy. Impaired coordination and gait instability. Left sided weakness affecting ADLs and return to work.   
TX COMPLETED: facilitated bed mobility with SPV, transfers with SBA, ambulated 50 ft x 2 CGA/min A with HHA. Recommend low intensity PT services.   
TX COMPLETED: facilitated bed mobility with mod I, transfers with SBA, ambulated 100 ft x 2 CGA/SBA with IV pole. Recommend continued PT services at low intensity.  
If you are a smoker, it is important for your health to stop smoking. Please be aware that second hand smoke is also harmful.